# Patient Record
Sex: MALE | Race: BLACK OR AFRICAN AMERICAN | Employment: UNEMPLOYED | RURAL
[De-identification: names, ages, dates, MRNs, and addresses within clinical notes are randomized per-mention and may not be internally consistent; named-entity substitution may affect disease eponyms.]

---

## 2017-03-21 ENCOUNTER — OFFICE VISIT (OUTPATIENT)
Dept: FAMILY MEDICINE CLINIC | Age: 57
End: 2017-03-21

## 2017-03-21 VITALS
RESPIRATION RATE: 20 BRPM | BODY MASS INDEX: 15.98 KG/M2 | TEMPERATURE: 99 F | HEART RATE: 78 BPM | DIASTOLIC BLOOD PRESSURE: 89 MMHG | HEIGHT: 73 IN | SYSTOLIC BLOOD PRESSURE: 112 MMHG | OXYGEN SATURATION: 99 % | WEIGHT: 120.6 LBS

## 2017-03-21 DIAGNOSIS — N39.0 URINARY TRACT INFECTION WITH HEMATURIA, SITE UNSPECIFIED: ICD-10-CM

## 2017-03-21 DIAGNOSIS — A59.9 TRICHOMONAS INFECTION: ICD-10-CM

## 2017-03-21 DIAGNOSIS — K52.9 GASTROENTERITIS: Primary | ICD-10-CM

## 2017-03-21 DIAGNOSIS — R31.9 URINARY TRACT INFECTION WITH HEMATURIA, SITE UNSPECIFIED: ICD-10-CM

## 2017-03-21 LAB
BILIRUB UR QL STRIP: NEGATIVE
GLUCOSE UR-MCNC: NEGATIVE MG/DL
KETONES P FAST UR STRIP-MCNC: NEGATIVE MG/DL
PH UR STRIP: 5.5 [PH] (ref 4.6–8)
PROT UR QL STRIP: NEGATIVE MG/DL
SP GR UR STRIP: 1.03 (ref 1–1.03)
UA UROBILINOGEN AMB POC: NORMAL (ref 0.2–1)
URINALYSIS CLARITY POC: CLEAR
URINALYSIS COLOR POC: YELLOW
URINE BLOOD POC: NORMAL
URINE LEUKOCYTES POC: NEGATIVE
URINE NITRITES POC: NEGATIVE

## 2017-03-21 RX ORDER — METRONIDAZOLE 500 MG/1
TABLET ORAL 3 TIMES DAILY
COMMUNITY
End: 2017-03-21

## 2017-03-21 RX ORDER — ONDANSETRON 4 MG/1
4 TABLET, ORALLY DISINTEGRATING ORAL
Qty: 30 TAB | Refills: 0 | Status: SHIPPED | OUTPATIENT
Start: 2017-03-21 | End: 2017-06-15

## 2017-03-21 RX ORDER — PROMETHAZINE HYDROCHLORIDE 25 MG/1
25 TABLET ORAL
COMMUNITY
End: 2017-06-15

## 2017-03-21 RX ORDER — CIPROFLOXACIN 500 MG/1
TABLET ORAL 2 TIMES DAILY
COMMUNITY
End: 2017-03-21

## 2017-03-21 RX ORDER — DICYCLOMINE HYDROCHLORIDE 20 MG/1
20 TABLET ORAL EVERY 6 HOURS
COMMUNITY
End: 2017-06-15

## 2017-03-21 NOTE — PROGRESS NOTES
Health Maintenance Due   Topic Date Due    DTaP/Tdap/Td series (1 - Tdap) 04/29/1979    INFLUENZA AGE 9 TO ADULT  08/01/2016    COLONOSCOPY  03/15/2017

## 2017-03-21 NOTE — PATIENT INSTRUCTIONS
Soft-Textured, [place] Diet: Care Instructions  Your Care Instructions  A soft-textured, bland diet is used when you need food that is easy to chew, swallow, and digest. You will need to choose soft foods that are low in spices and seasonings. You will need to avoid high-fat foods, as well as caffeine and alcohol. Your doctor or dietitian can help you plan a soft-textured, bland diet based on your health and what you prefer to eat. Ask your doctor how long you should stay on this diet. As you get better, you will probably be able to go back to a regular diet. Talk with your doctor or dietitian before you make changes in your diet. Follow-up care is a key part of your treatment and safety. Be sure to make and go to all appointments, and call your doctor if you are having problems. Its also a good idea to know your test results and keep a list of the medicines you take. How can you care for yourself at home? · Choose foods that are easy to chew and swallow. Good choices are mashed potatoes, soft breads and rolls, cream soups, oatmeal, and Cream of Wheat. · Choose soft, well-cooked vegetables and soft or canned fruits. Good choices are applesauce, ripe bananas, and non-citrus fruit juice. · Try milk, yogurt, or other milk products, if you can digest dairy without too many problems. Your doctor may limit milk and milk products for a while. If so, he or she may recommend a calcium and vitamin D supplement. · Choose soft protein foods such as eggs, tofu, steamed fish, chicken, and turkey. Slow-cooking methods, such as stewing, will help soften meat. Chopping meat in a  or  also will make it easier to eat. · Avoid nuts, raw vegetables, hard crackers, tough meats, and prunes and prune juice. · Avoid foods that are very spicy, such as foods seasoned with black pepper, chili peppers, horseradish, or hot sauce.   · Avoid highly acidic foods such as citrus fruits, citrus fruit juices, and tomato-based foods. · Avoid high-fat foods such as fried meat, chips, and rich desserts. · Check with your doctor before you drink alcohol or beverages that have caffeine, such as coffee, tea, and cola beverages. Where can you learn more? Go to http://young-candis.info/. Enter J802 in the search box to learn more about \"Soft-Textured, Savanah Mims Diet: Care Instructions. \"  Current as of: July 26, 2016  Content Version: 11.1  © 4437-6144 [employer]. Care instructions adapted under license by [employer] (which disclaims liability or warranty for this information). If you have questions about a medical condition or this instruction, always ask your healthcare professional. [address] any warranty or liability for your use of this information.

## 2017-03-21 NOTE — MR AVS SNAPSHOT
Visit Information Date & Time Provider Department Dept. Phone Encounter #  
 3/21/2017  2:50 PM Oz Pizarro [facility] [phone] [id number] Your Appointments 6/15/2017  8:20 AM  
ROUTINE CARE with John Pretty MD  
[address] [facility] [address] [facility]) Appt Note: 6 mnth est pt for refills fasting labs [address] [address]  
[address] [address] Upcoming Health Maintenance Date Due DTaP/Tdap/Td series (1 - Tdap) 4/29/1979 INFLUENZA AGE 9 TO ADULT 8/1/2016 COLONOSCOPY 3/15/2017 Allergies as of 3/21/2017  Review Complete On: 3/21/2017 By: Mojgan Kent No Known Allergies Current Immunizations  Never Reviewed Name Date Pneumococcal Polysaccharide (PPSV-23) 8/28/2015  9:11 AM  
  
 Not reviewed this visit You Were Diagnosed With   
  
 Codes Comments Gastroenteritis    -  Primary ICD-10-CM: K52.9 ICD-9-CM: 558.9 Trichomonas infection     ICD-10-CM: A59.9 ICD-9-CM: 131.9 Vitals BP Pulse Temp Resp Height(growth percentile) Weight(growth percentile) 112/89 78 99 °F (37.2 °C) (Oral) 20 6' 1\" (1.854 m) 120 lb 9.6 oz (54.7 kg) SpO2 BMI Smoking Status 99% 15.91 kg/m2 Current Some Day Smoker Vitals History BMI and BSA Data Body Mass Index Body Surface Area 15.91 kg/m 2 1.68 m 2 Preferred Pharmacy Pharmacy Name Phone [address] [place], [place] - [address] [place] [address] [phone] Your Updated Medication List  
  
   
This list is accurate as of: 3/21/17  3:44 PM.  Always use your most recent med list.  
  
  
  
  
 acetaminophen 325 mg tablet Commonly known as:  TYLENOL Take  by mouth every four (4) hours as needed for Pain. aspirin delayed-release 81 mg tablet TAKE 1 TABLET BY MOUTH DAILY BENTYL 20 mg tablet Generic drug:  dicyclomine Take 20 mg by mouth every six (6) hours. hydroCHLOROthiazide 12.5 mg tablet Commonly known as:  HYDRODIURIL  
TAKE 1 TABLET BY MOUTH DAILY  
  
 ondansetron 4 mg disintegrating tablet Commonly known as:  ZOFRAN ODT Take 1 Tab by mouth every eight (8) hours as needed for Nausea. promethazine 25 mg tablet Commonly known as:  PHENERGAN Take 25 mg by mouth every six (6) hours as needed for Nausea (take every other time with zofran). VENTOLIN HFA 90 mcg/actuation inhaler Generic drug:  albuterol Take 1 Puff by inhalation every six (6) hours as needed for Wheezing. Prescriptions Sent to Pharmacy Refills  
 ondansetron (ZOFRAN ODT) 4 mg disintegrating tablet 0 Sig: Take 1 Tab by mouth every eight (8) hours as needed for Nausea. Class: Normal  
 Pharmacy: [address] [place] [address] #: [phone] Route: Oral  
  
We Performed the Following CULTURE, URINE [id number] CPT(R)] TRICHOMONAS CULTURE [[id number] CPT(R)] To-Do List   
 03/21/2017 Lab:  Janes Young / Gary Layton Patient Instructions Soft-Textured, [place] Diet: Care Instructions Your Care Instructions A soft-textured, bland diet is used when you need food that is easy to chew, swallow, and digest. You will need to choose soft foods that are low in spices and seasonings. You will need to avoid high-fat foods, as well as caffeine and alcohol. Your doctor or dietitian can help you plan a soft-textured, bland diet based on your health and what you prefer to eat. Ask your doctor how long you should stay on this diet. As you get better, you will probably be able to go back to a regular diet. Talk with your doctor or dietitian before you make changes in your diet. Follow-up care is a key part of your treatment and safety.  Be sure to make and go to all appointments, and call your doctor if you are having problems. Its also a good idea to know your test results and keep a list of the medicines you take. How can you care for yourself at home? · Choose foods that are easy to chew and swallow. Good choices are mashed potatoes, soft breads and rolls, cream soups, oatmeal, and Cream of Wheat. · Choose soft, well-cooked vegetables and soft or canned fruits. Good choices are applesauce, ripe bananas, and non-citrus fruit juice. · Try milk, yogurt, or other milk products, if you can digest dairy without too many problems. Your doctor may limit milk and milk products for a while. If so, he or she may recommend a calcium and vitamin D supplement. · Choose soft protein foods such as eggs, tofu, steamed fish, chicken, and turkey. Slow-cooking methods, such as stewing, will help soften meat. Chopping meat in a  or  also will make it easier to eat. · Avoid nuts, raw vegetables, hard crackers, tough meats, and prunes and prune juice. · Avoid foods that are very spicy, such as foods seasoned with black pepper, chili peppers, horseradish, or hot sauce. · Avoid highly acidic foods such as citrus fruits, citrus fruit juices, and tomato-based foods. · Avoid high-fat foods such as fried meat, chips, and rich desserts. · Check with your doctor before you drink alcohol or beverages that have caffeine, such as coffee, tea, and cola beverages. Where can you learn more? Go to http://young-candis.info/. Enter J213 in the search box to learn more about \"Soft-Textured, Glorya Bouillon Diet: Care Instructions. \" Current as of: July 26, 2016 Content Version: 11.1 © 7402-0402 [employer]. Care instructions adapted under license by [employer] (which disclaims liability or warranty for this information).  If you have questions about a medical condition or this instruction, always ask your healthcare professional. [place] [address] any warranty or liability for your use of this information. Introducing [facility]! [employer] introduces [employer] patient portal. Now you can access parts of your medical record, email your doctor's office, and request medication refills online. 1. In your internet browser, go to [email or web address] 2. Click on the First Time User? Click Here link in the Sign In box. You will see the New Member Sign Up page. 3. Enter your Te[employer]Street Access Code exactly as it appears below. You will not need to use this code after youve completed the sign-up process. If you do not sign up before the expiration date, you must request a new code. · [employer] Access Code: [email or web address] Expires: 6/19/2017  2:24 PM 
 
4. Enter the last four digits of your Social Security Number (xxxx) and Date of Birth (mm/dd/yyyy) as indicated and click Submit. You will be taken to the next sign-up page. 5. Create a TeachStreet ID. This will be your [employer]Street login ID and cannot be changed, so think of one that is secure and easy to remember. 6. Create a Te[employer]Street password. You can change your password at any time. 7. Enter your Password Reset Question and Answer. This can be used at a later time if you forget your password. 8. Enter your e-mail address. You will receive e-mail notification when new information is available in [address] 9. Click Sign Up. You can now view and download portions of your medical record. 10. Click the Download Summary menu link to download a portable copy of your medical information. If you have questions, please visit the Frequently Asked Questions section of the TeachStreet website. Remember, [employer] is NOT to be used for urgent needs. For medical emergencies, dial 911. Now available from your iPhone and Android! Please provide this summary of care documentation to your next provider. Your primary care clinician is listed as Jamil Wright. If you have any questions after today's visit, please call [phone].

## 2017-03-21 NOTE — PROGRESS NOTES
Sebas Garibay. is an 60 y.o. male who presents with chief concern of ER follow up. March 17th he was seen by [facility]. Diagnosed with gastroenteritis, and trichomonas with UTI. He was treated with cipro/flagyl, bentyl, and promethazine. He is now having severe nausea with diarrhea. No blood or blackness to it. No recent sexual activity. He has stopped taking the antibiotics today because it is making him sicker. He kept down 4 days of the treatment. He has not been able to eat. Drinking OK. Having pain all across the stomach. Largely improved but still with some mild burning pain with urination. Subjective fevers and chills. 2x diarrhea today intermittent with formed stools between. He does not drink alcohol. Admits metallic taste in mouth. Weight loss:  He has had undulating weights over the last 1 year and recently has had poor PO intake (other than fluids). Weight is net negative 21 lb in last year. He denies any night sweats. He is smoker. Detailed ROS below. Review of Systems, positives are bolded, strike through if denied. GEN:    Weight loss, CV:      Pulm:    Abd/GI:  Abdominal pain/Nausea/Diarrhea Psych:    Neuro:     ENT:      Endocrine:     :    Dysuria  MSK:      Skin:    Lower Ext:           Current and past medical information:  I personally reviewed and included updated list below.     Past Medical History:   Diagnosis Date    Cervical osteoarthritis 8/27/2015    Chronic pain     neck and back    Hypertension     Lumbar back pain     Neck pain        No Known Allergies    Past Surgical History:   Procedure Laterality Date    HX ORTHOPAEDIC      left knee       Social History     Social History    Marital status:      Spouse name: N/A    Number of children: N/A    Years of education: N/A     Social History Main Topics    Smoking status: Current Some Day Smoker     Packs/day: 1.00    Smokeless tobacco: None    Alcohol use 0.0 oz/week     0 Standard drinks or equivalent per week      Comment: occassioally    Drug use: No    Sexual activity: Yes     Partners: Female     Other Topics Concern    None     Social History Narrative           Physical Exam, Abnormal/pertinent findings bolded,     Visit Vitals    /89    Pulse 78    Temp 99 °F (37.2 °C) (Oral)    Resp 20    Ht 6' 1\" (1.854 m)    Wt 120 lb 9.6 oz (54.7 kg)    SpO2 99%    BMI 15.91 kg/m2          GEN:    Alert and Oriented, No acute distress  Psych:  Mood appropriate, No pressured speech, linear thoughts  CV:    Regular Rhythm, S1 and S2 audible, no MRG, no palpable thrills  Pulm:    CTA B/L, no wheezes/rubs  GI/Abd:   Mildly tender to palpation in RL and LLQ, normal bowel sounds x4, no masses, (-) involuntary or voluntary guarding  Neuro:   Lucid, No focal deficits  ENT:    EOMI, Non-icteric sclera, MMM  Neck:   Trachea midline, no lymphadenopathy  :    No suprapubic tenderness  MSK:  Normal gait, FROM in all four extremities  Skin:   No visible Rash, Ecchymosis, or Excoriations  Lower Ext: No edema, No tenderness to palpation, No palpable cords        Assessment/PLAN    1. Gastroenteritis  - He has likely been treated adequately for UTI if uncomplicated considering being male no longer automatically makes it complicated. Due to his struggle with PO intake and other side effects from the flagyl, will stop those after 4 doses and monitor.    - ondansetron (ZOFRAN ODT) 4 mg disintegrating tablet; Take 1 Tab by mouth every eight (8) hours as needed for Nausea. Dispense: 30 Tab; Refill: 0    2. Trichomonas infection    3. Urinary tract infection with hematuria, site unspecified  - AMB POC URINALYSIS DIP STICK AUTO W/O MICRO  - We only have discharge instructions from his ER visit which do not show how the diagnosis was made. Will send for culture and check for other STI's. UA today negative for signs of infection.   If sterile pyuria, consider chlamydia. - CULTURE, [place] [address] [facility]; Future  - TRICHOMONAS CULTURE    Body mass index is 15.91 kg/(m^2). I have reviewed/discussed the below normal BMI with the patient. I have recommended the following interventions: weight gain advised, dietary management education, guidance, and counseling, dietary education for weight gain and lifestyle education regarding diet. The plan is as follows: there is concern for 20# weight loss in the last 1 year. Consider CT abdomen and pelvis with contrast if he does not return to his baseline weight when GE improved. . .      Smoking 1 PPD:  The patient was counseled on the dangers of tobacco use, and was advised to quit and reluctant to quit. Reviewed strategies to maximize success, including removing cigarettes and smoking materials from environment. Follow-up Disposition: Not on File  For next visit consider CT AB&P with contrast to evaluate for indolent mass such as pancreas or other cause of weight loss. 1 week to see if improved abdominal pain. Consider CBC or stool culture and studies. Plan of care:  Discussed diagnoses in detail with patient. Medication risks/benefits/side effects discussed with patient. All of the patient's questions were addressed. The patient understands and agrees with our plan of care. The patient knows to call back if they are unsure of or forget any changes we discussed today or if the symptoms change. The patient received an After-Visit Summary which contains VS, orders, medication list and allergy list. This can be used as a \"mini-medical record\" should they have to seek medical care while out of town.       Thuy Grewal DO  Resident note, PGY-3  Patient discussed with Precept Physician, Rita Lr MD    Future Appointments  Date Time Provider Sachi Muñoz   3/28/2017 8:00 AM Tammy Henao DO [address]   6/15/2017 8:20 AM Little Patel MD [facility] Current Medications after this visit[de-identified]       Current Outpatient Prescriptions   Medication Sig    promethazine (PHENERGAN) 25 mg tablet Take 25 mg by mouth every six (6) hours as needed for Nausea (take every other time with zofran).  dicyclomine (BENTYL) 20 mg tablet Take 20 mg by mouth every six (6) hours.  ondansetron (ZOFRAN ODT) 4 mg disintegrating tablet Take 1 Tab by mouth every eight (8) hours as needed for Nausea.  hydroCHLOROthiazide (HYDRODIURIL) 12.5 mg tablet TAKE 1 TABLET BY MOUTH DAILY    aspirin delayed-release 81 mg tablet TAKE 1 TABLET BY MOUTH DAILY    acetaminophen (TYLENOL) 325 mg tablet Take  by mouth every four (4) hours as needed for Pain.  VENTOLIN HFA 90 mcg/actuation inhaler Take 1 Puff by inhalation every six (6) hours as needed for Wheezing. No current facility-administered medications for this visit.

## 2017-03-21 NOTE — PROGRESS NOTES
I reviewed with the resident the medical history and the resident's findings on the physical examination. I discussed with the resident the patient's diagnosis and concur with the plan. Difficult to tell exactly what happened in ER. Records requested. Would strongly recommend testing for HIV in the future given weight loss and h/o STI. ER precautions given for abdominal pain and close clinic f/u.

## 2017-03-23 ENCOUNTER — DOCUMENTATION ONLY (OUTPATIENT)
Dept: FAMILY MEDICINE CLINIC | Age: 57
End: 2017-03-23

## 2017-03-23 DIAGNOSIS — N20.0 STAGHORN RENAL CALCULUS: Primary | ICD-10-CM

## 2017-03-23 DIAGNOSIS — N28.1 RENAL CYST: ICD-10-CM

## 2017-03-23 LAB
C TRACH RRNA SPEC QL NAA+PROBE: NEGATIVE
N GONORRHOEA RRNA SPEC QL NAA+PROBE: NEGATIVE

## 2017-03-23 NOTE — PROGRESS NOTES
Records received and to be scanned to chart. CT w contrast on March 17th, 2017 revealed right sided small renal cysts. There is a large simple cyst 5.4x6.8 cm. A large staghorn calculus in superior pole of left kidney 2 cm x 1.6 cm without hydronephrosis. No definitive pancreatitis. No mention of diverticulitis. Diagnosed with Trichomonas, Gastroenteritis and UTI.      Leroy Gomez DO   2:22 PM   03/23/17

## 2017-03-25 LAB — BACTERIA UR CULT: NO GROWTH

## 2017-03-30 ENCOUNTER — OFFICE VISIT (OUTPATIENT)
Dept: FAMILY MEDICINE CLINIC | Age: 57
End: 2017-03-30

## 2017-03-30 VITALS
SYSTOLIC BLOOD PRESSURE: 116 MMHG | RESPIRATION RATE: 14 BRPM | BODY MASS INDEX: 16.81 KG/M2 | OXYGEN SATURATION: 99 % | HEART RATE: 74 BPM | TEMPERATURE: 97.6 F | WEIGHT: 126.8 LBS | HEIGHT: 73 IN | DIASTOLIC BLOOD PRESSURE: 77 MMHG

## 2017-03-30 DIAGNOSIS — N20.0 STAGHORN KIDNEY STONES: ICD-10-CM

## 2017-03-30 DIAGNOSIS — I10 ESSENTIAL HYPERTENSION: Primary | ICD-10-CM

## 2017-03-30 DIAGNOSIS — N28.1 RENAL CYST: ICD-10-CM

## 2017-03-30 RX ORDER — HYDROCHLOROTHIAZIDE 12.5 MG/1
TABLET ORAL
Qty: 30 TAB | Refills: 5 | Status: SHIPPED | OUTPATIENT
Start: 2017-03-30 | End: 2018-01-25 | Stop reason: SDUPTHER

## 2017-03-30 NOTE — PROGRESS NOTES
Reviewed record in preparation for visit and have necessary documentation      Body mass index is 16.73 kg/(m^2).     Health Maintenance Due   Topic Date Due    DTaP/Tdap/Td series (1 - Tdap) 04/29/1979    INFLUENZA AGE 9 TO ADULT  08/01/2016    COLONOSCOPY  03/15/2017

## 2017-03-30 NOTE — MR AVS SNAPSHOT
Visit Information Date & Time Provider Department Dept. Phone Encounter #  
 3/30/2017  9:10 AM Lou Lloyd, [facility] [phone] [id number] Your Appointments 6/15/2017  8:20 AM  
ROUTINE CARE with Donnette Gitelman, MD  
[address] [facility] [address] [facility]) Appt Note: 6 mnth est pt for refills fasting labs [id number] [address]  
[address] [place] [id number] Upcoming Health Maintenance Date Due DTaP/Tdap/Td series (1 - Tdap) 4/29/1979 INFLUENZA AGE 9 TO ADULT 8/1/2016 COLONOSCOPY 3/15/2017 Allergies as of 3/30/2017  Review Complete On: 3/30/2017 By: Trena Mascorro No Known Allergies Current Immunizations  Never Reviewed Name Date Pneumococcal Polysaccharide (PPSV-23) 8/28/2015  9:11 AM  
  
 Not reviewed this visit You Were Diagnosed With   
  
 Codes Comments Essential hypertension    -  Primary ICD-10-CM: I10 
ICD-9-CM: 401.9 Vitals BP Pulse Temp Resp Height(growth percentile) Weight(growth percentile) 116/77 (BP 1 Location: Right arm, BP Patient Position: Sitting) 74 97.6 °F (36.4 °C) (Oral) 14 6' 1\" (1.854 m) 126 lb 12.8 oz (57.5 kg) SpO2 BMI Smoking Status 99% 16.73 kg/m2 Current Some Day Smoker Vitals History BMI and BSA Data Body Mass Index Body Surface Area  
 16.73 kg/m 2 1.72 m 2 Preferred Pharmacy Pharmacy Name Phone [address] [place], [place] - [facility] [phone] Your Updated Medication List  
  
   
This list is accurate as of: 3/30/17  9:28 AM.  Always use your most recent med list.  
  
  
  
  
 acetaminophen 325 mg tablet Commonly known as:  TYLENOL Take  by mouth every four (4) hours as needed for Pain. aspirin delayed-release 81 mg tablet TAKE 1 TABLET BY MOUTH DAILY BENTYL 20 mg tablet Generic drug:  dicyclomine Take 20 mg by mouth every six (6) hours. hydroCHLOROthiazide 12.5 mg tablet Commonly known as:  HYDRODIURIL  
TAKE 1 TABLET BY MOUTH DAILY  
  
 ondansetron 4 mg disintegrating tablet Commonly known as:  ZOFRAN ODT Take 1 Tab by mouth every eight (8) hours as needed for Nausea. promethazine 25 mg tablet Commonly known as:  PHENERGAN Take 25 mg by mouth every six (6) hours as needed for Nausea (take every other time with zofran). VENTOLIN HFA 90 mcg/actuation inhaler Generic drug:  albuterol Take 1 Puff by inhalation every six (6) hours as needed for Wheezing. Prescriptions Sent to Pharmacy Refills  
 hydroCHLOROthiazide (HYDRODIURIL) 12.5 mg tablet 5 Sig: TAKE 1 TABLET BY MOUTH DAILY Class: Normal  
 Pharmacy: [facility] #: [phone] Patient Instructions Kidney Stone: Care Instructions Your Care Instructions Kidney stones are formed when salts, minerals, and other substances normally found in the urine clump together. They can be as small as grains of sand or, rarely, as large as golf balls. While the stone is traveling through the ureter, which is the tube that carries urine from the kidney to the bladder, you will probably feel pain. The pain may be mild or very severe. You may also have some blood in your urine. As soon as the stone reaches the bladder, any intense pain should go away. If a stone is too large to pass on its own, you may need a medical procedure to help you pass the stone. The doctor has checked you carefully, but problems can develop later. If you notice any problems or new symptoms, get medical treatment right away. Follow-up care is a key part of your treatment and safety.  Be sure to make and go to all appointments, and call your doctor if you are having problems. It's also a good idea to know your test results and keep a list of the medicines you take. How can you care for yourself at home? · Drink plenty of fluids, enough so that your urine is light yellow or clear like water. If you have kidney, heart, or liver disease and have to limit fluids, talk with your doctor before you increase the amount of fluids you drink. · Take pain medicines exactly as directed. Call your doctor if you think you are having a problem with your medicine. ¨ If the doctor gave you a prescription medicine for pain, take it as prescribed. ¨ If you are not taking a prescription pain medicine, ask your doctor if you can take an over-the-counter medicine. Read and follow all instructions on the label. · Your doctor may ask you to strain your urine so that you can collect your kidney stone when it passes. You can use a kitchen strainer or a tea strainer to catch the stone. Store it in a plastic bag until you see your doctor again. Preventing future kidney stones Some changes in your diet may help prevent kidney stones. Depending on the cause of your stones, your doctor may recommend that you: · Drink plenty of fluids, enough so that your urine is light yellow or clear like water. If you have kidney, heart, or liver disease and have to limit fluids, talk with your doctor before you increase the amount of fluids you drink. · Limit coffee, tea, and alcohol. Also avoid grapefruit juice. · Do not take more than the recommended daily dose of vitamins C and D. 
· Avoid antacids such as Gaviscon, Maalox, Mylanta, or Tums. · Limit the amount of salt (sodium) in your diet. · Eat a balanced diet that is not too high in protein. · Limit foods that are high in a substance called oxalate, which can cause kidney stones. These foods include dark green vegetables, rhubarb, chocolate, wheat bran, nuts, cranberries, and beans. When should you call for help? Call your doctor now or seek immediate medical care if: 
· You cannot keep down fluids. · Your pain gets worse. · You have a fever or chills. · You have new or worse pain in your back just below your rib cage (the flank area). · You have new or more blood in your urine. Watch closely for changes in your health, and be sure to contact your doctor if: 
· You do not get better as expected. Where can you learn more? Go to http://young-candis.info/. Enter A508 in the search box to learn more about \"Kidney Stone: Care Instructions. \" Current as of: November 20, 2015 Content Version: 11.2 © 0695-8569 [employer]. Care instructions adapted under license by [employer] (which disclaims liability or warranty for this information). If you have questions about a medical condition or this instruction, always ask your healthcare professional. [address] any warranty or liability for your use of this information. Introducing [facility]! [facility] introduces Mobile Authentication patient portal. Now you can access parts of your medical record, email your doctor's office, and request medication refills online. 1. In your internet browser, go to [email or web address] 2. Click on the First Time User? Click Here link in the Sign In box. You will see the New Member Sign Up page. 3. Enter your Mobile Authentication Access Code exactly as it appears below. You will not need to use this code after youve completed the sign-up process. If you do not sign up before the expiration date, you must request a new code. · Mobile Authentication Access Code: [email or web address] Expires: 6/19/2017  2:24 PM 
 
4. Enter the last four digits of your Social Security Number (xxxx) and Date of Birth (mm/dd/yyyy) as indicated and click Submit. You will be taken to the next sign-up page. 5. Create a Mobile Authentication ID.  This will be your Mobile Authentication login ID and cannot be changed, so think of one that is secure and easy to remember. 6. Create a [employer] password. You can change your password at any time. 7. Enter your Password Reset Question and Answer. This can be used at a later time if you forget your password. 8. Enter your e-mail address. You will receive e-mail notification when new information is available in [address] 9. Click Sign Up. You can now view and download portions of your medical record. 10. Click the Download Summary menu link to download a portable copy of your medical information. If you have questions, please visit the Frequently Asked Questions section of the [employer] website. Remember, [employer] is NOT to be used for urgent needs. For medical emergencies, dial 911. Now available from your iPhone and Android! Please provide this summary of care documentation to your next provider. Your primary care clinician is listed as Darlene Jones. If you have any questions after today's visit, please call [phone].

## 2017-03-30 NOTE — PROGRESS NOTES
I discussed the findings, assessment and plan with the resident and agree with the resident's findings and plan as documented in the resident's note. Nino Mesa M.D.

## 2017-03-30 NOTE — PATIENT INSTRUCTIONS
Kidney Stone: Care Instructions  Your Care Instructions    Kidney stones are formed when salts, minerals, and other substances normally found in the urine clump together. They can be as small as grains of sand or, rarely, as large as golf balls. While the stone is traveling through the ureter, which is the tube that carries urine from the kidney to the bladder, you will probably feel pain. The pain may be mild or very severe. You may also have some blood in your urine. As soon as the stone reaches the bladder, any intense pain should go away. If a stone is too large to pass on its own, you may need a medical procedure to help you pass the stone. The doctor has checked you carefully, but problems can develop later. If you notice any problems or new symptoms, get medical treatment right away. Follow-up care is a key part of your treatment and safety. Be sure to make and go to all appointments, and call your doctor if you are having problems. It's also a good idea to know your test results and keep a list of the medicines you take. How can you care for yourself at home? · Drink plenty of fluids, enough so that your urine is light yellow or clear like water. If you have kidney, heart, or liver disease and have to limit fluids, talk with your doctor before you increase the amount of fluids you drink. · Take pain medicines exactly as directed. Call your doctor if you think you are having a problem with your medicine. ¨ If the doctor gave you a prescription medicine for pain, take it as prescribed. ¨ If you are not taking a prescription pain medicine, ask your doctor if you can take an over-the-counter medicine. Read and follow all instructions on the label. · Your doctor may ask you to strain your urine so that you can collect your kidney stone when it passes. You can use a kitchen strainer or a tea strainer to catch the stone. Store it in a plastic bag until you see your doctor again.   Preventing future kidney stones  Some changes in your diet may help prevent kidney stones. Depending on the cause of your stones, your doctor may recommend that you:  · Drink plenty of fluids, enough so that your urine is light yellow or clear like water. If you have kidney, heart, or liver disease and have to limit fluids, talk with your doctor before you increase the amount of fluids you drink. · Limit coffee, tea, and alcohol. Also avoid grapefruit juice. · Do not take more than the recommended daily dose of vitamins C and D.  · Avoid antacids such as Gaviscon, Maalox, Mylanta, or Tums. · Limit the amount of salt (sodium) in your diet. · Eat a balanced diet that is not too high in protein. · Limit foods that are high in a substance called oxalate, which can cause kidney stones. These foods include dark green vegetables, rhubarb, chocolate, wheat bran, nuts, cranberries, and beans. When should you call for help? Call your doctor now or seek immediate medical care if:  · You cannot keep down fluids. · Your pain gets worse. · You have a fever or chills. · You have new or worse pain in your back just below your rib cage (the flank area). · You have new or more blood in your urine. Watch closely for changes in your health, and be sure to contact your doctor if:  · You do not get better as expected. Where can you learn more? Go to http://young-candis.info/. Enter J190 in the search box to learn more about \"Kidney Stone: Care Instructions. \"  Current as of: November 20, 2015  Content Version: 11.2  © 8572-7234 [employer]. Care instructions adapted under license by [employer] (which disclaims liability or warranty for this information). If you have questions about a medical condition or this instruction, always ask your healthcare professional. [address] any warranty or liability for your use of this information.

## 2017-03-30 NOTE — PROGRESS NOTES
Maldonado Bello. is an 60 y.o. male who presents with chief concern of follow up staghorn stone. Patient seen last week for abdominal pain of unknown origin as a follow up from ED. His patient instructions were for trichomonas and mentioned diagnosis of gastroenteritis with atypical treatment plan. He was having diarrhea from antibiotics so they were stopped. Later that week, the studies done at ER visit were sent and were discordant with story. \"Records received and to be scanned to chart.      CT w contrast on March 17th, 2017 revealed right sided small renal cysts. There is a large simple cyst 5.4x6.8 cm. A large staghorn calculus in superior pole of left kidney 2 cm x 1.6 cm without hydronephrosis. No definitive pancreatitis. No mention of diverticulitis.      Diagnosed with Trichomonas, Gastroenteritis and UTI.      Girish Dupree, DO   2:22 PM   03/23/17\"    He was promptly sent to Urology and seen. They have suggested MRI and follow up for consideration of surgical vs conservative treatment plan for the large cysts and stones. His abdominal pain is still colicky in nature but is very mild. He has done well since stopping the cipro/flagyl and no longer having diarrhea. Denies fevers, chills, night sweats. Denies dysuria, hematuria or uti like symptoms. HTN:  He does not check at home and is in need of refill. He denies any chest pain, palp, SOB, MONTOYA, HA, VC, leg swelling. Detailed ROS below. Review of Systems, positives are bolded, strike through if denied. GEN:    CV:      Pulm:    Abd/GI:  Abdominal pain Psych:    Neuro:     ENT:      Endocrine:     :      MSK:      Skin:    Lower Ext:           Current and past medical information:  I personally reviewed and included updated list below.     Past Medical History:   Diagnosis Date    Cervical osteoarthritis 8/27/2015    Chronic pain     neck and back    Hypertension     Lumbar back pain     Neck pain     Staghorn kidney stones 3/30/2017    Staghorn kidney stones 3/30/2017    A large staghorn calculus in superior pole of left kidney 2 cm x 1.6 cm       No Known Allergies    Past Surgical History:   Procedure Laterality Date    HX ORTHOPAEDIC      left knee       Social History     Social History    Marital status:      Spouse name: N/A    Number of children: N/A    Years of education: N/A     Social History Main Topics    Smoking status: Current Some Day Smoker     Packs/day: 1.00    Smokeless tobacco: None    Alcohol use 0.0 oz/week     0 Standard drinks or equivalent per week      Comment: occassioally    Drug use: No    Sexual activity: Yes     Partners: Female     Other Topics Concern    None     Social History Narrative           Physical Exam, Abnormal/pertinent findings bolded,     Visit Vitals    /77 (BP 1 Location: Right arm, BP Patient Position: Sitting)    Pulse 74    Temp 97.6 °F (36.4 °C) (Oral)    Resp 14    Ht 6' 1\" (1.854 m)    Wt 126 lb 12.8 oz (57.5 kg)    SpO2 99%    BMI 16.73 kg/m2          GEN:    Pleasant, Much more comfortable appearing, Alert and Oriented, No acute distress  Psych:  Mood appropriate, No pressured speech, linear thoughts  CV:    Regular Rhythm, S1 and S2 audible, no MRG, no palpable thrills  Pulm:    CTA B/L, no wheezes/rubs  GI/Abd:   Non-tender to palpation, normal bowel sounds x4, no masses, (-) involuntary or voluntary guarding  Neuro:   Lucid, No focal deficits  ENT:    EOMI, Non-icteric sclera, MMM  Neck:   Trachea midline, no lymphadenopathy  :    No suprapubic tenderness  MSK:  Normal gait, FROM in all four extremities  Skin:   No visible Rash, Ecchymosis, or Excoriations  Lower Ext: No edema, No tenderness to palpation, No palpable cords        Assessment/PLAN    1. Essential hypertension  - Doing well, BP diastolic initially elevated but on repeat was perfect. Plan for refill of current medication.   HCTZ should not increase his risk for his particular type of kidney stone. Consider switching as Ca+Ox stones may be more present in HCTZ. - hydroCHLOROthiazide (HYDRODIURIL) 12.5 mg tablet; TAKE 1 TABLET BY MOUTH DAILY  Dispense: 30 Tab; Refill: 5    2. Staghorn kidney stones  - Continue to f/u with urology. Discussed possible plan of care and expectations for Urologist. He had MRI and plans to follow up with them early next week. Consider urine and stone studies. 3. Renal cyst  - Continue to monitor with urology. He had microscopic hematuria which should be evaluated once stone is resolved. Described as simple in nature. Body mass index is 16.73 kg/(m^2). I have reviewed/discussed the below normal BMI with the patient and spouse. I have recommended the following interventions: weight gain advised and nutrition/feeding management. .    Follow-up Disposition: Not on File  For next visit follow up for routine care, as needed for stone management and pain control. Plan of care:  Discussed diagnoses in detail with patient. Medication risks/benefits/side effects discussed with patient. All of the patient's questions were addressed. The patient understands and agrees with our plan of care. The patient knows to call back if they are unsure of or forget any changes we discussed today or if the symptoms change. The patient received an After-Visit Summary which contains VS, orders, medication list and allergy list. This can be used as a \"mini-medical record\" should they have to seek medical care while out of town.       Sparkle Castillo DO  Resident note, PGY-3  Patient discussed with [address] [place] [address] Physician, Dr. Mark Domingo  Date Time Provider Sachi Ramirezi   5/30/2017 9:30 AM Sparkle Castillo DO [facility] SCHED   6/15/2017 8:20 AM Darien Calderón MD [facility] SCHED           Current Medications after this visit[de-identified]       Current Outpatient Prescriptions   Medication Sig    hydroCHLOROthiazide (HYDRODIURIL) 12.5 mg tablet TAKE 1 TABLET BY MOUTH DAILY    promethazine (PHENERGAN) 25 mg tablet Take 25 mg by mouth every six (6) hours as needed for Nausea (take every other time with zofran).  dicyclomine (BENTYL) 20 mg tablet Take 20 mg by mouth every six (6) hours.  ondansetron (ZOFRAN ODT) 4 mg disintegrating tablet Take 1 Tab by mouth every eight (8) hours as needed for Nausea.  aspirin delayed-release 81 mg tablet TAKE 1 TABLET BY MOUTH DAILY    acetaminophen (TYLENOL) 325 mg tablet Take  by mouth every four (4) hours as needed for Pain.  VENTOLIN HFA 90 mcg/actuation inhaler Take 1 Puff by inhalation every six (6) hours as needed for Wheezing. No current facility-administered medications for this visit.

## 2017-04-04 ENCOUNTER — DOCUMENTATION ONLY (OUTPATIENT)
Dept: FAMILY MEDICINE CLINIC | Age: 57
End: 2017-04-04

## 2017-04-04 NOTE — PROGRESS NOTES
Note received and to be scanned from Urology. Renal calculus to be treated surgical or endoscopically at next available appointment, per 4/3/17 visit with Dr. Armando Martin     Will follow.     Matt Keenan,   12:41 PM

## 2017-05-24 ENCOUNTER — PATIENT OUTREACH (OUTPATIENT)
Dept: FAMILY MEDICINE CLINIC | Age: 57
End: 2017-05-24

## 2017-05-24 NOTE — PROGRESS NOTES
[facility] CJ 5/16-5/23/2017 PCNL treatment    Unable to reach by phone. Voicemail left requesting return call. Patient admitted for pcnl treatment of left sided kidney stones. Admitted for pain control and hematoma. CT scans both showing resolution and stable. Patient continued to improve.

## 2017-05-31 ENCOUNTER — PATIENT OUTREACH (OUTPATIENT)
Dept: FAMILY MEDICINE CLINIC | Age: 57
End: 2017-05-31

## 2017-05-31 NOTE — LETTER
5/31/2017 3:51 PM 
 
Mr. [address] [id number] Your health is very important to us. I have been unable to reach you by phone concerning your recent hospitalization. You also missed your follow up appointment with us yesterday. It is important that you follow up with your primary care physician. Please call our office today to reschedule an appointment. Sincerely, Francia Zamorano RN

## 2017-05-31 NOTE — PROGRESS NOTES
[facility] W 5/16-5/23/2017 PCNL treatment    NN unable to reach by phone after multiple attempts. Patient was a no show to appointment yesterday. Will mail letter today.

## 2017-06-15 ENCOUNTER — OFFICE VISIT (OUTPATIENT)
Dept: FAMILY MEDICINE CLINIC | Age: 57
End: 2017-06-15

## 2017-06-15 ENCOUNTER — PATIENT OUTREACH (OUTPATIENT)
Dept: FAMILY MEDICINE CLINIC | Age: 57
End: 2017-06-15

## 2017-06-15 VITALS
TEMPERATURE: 98.4 F | SYSTOLIC BLOOD PRESSURE: 112 MMHG | OXYGEN SATURATION: 100 % | RESPIRATION RATE: 16 BRPM | DIASTOLIC BLOOD PRESSURE: 82 MMHG | HEART RATE: 87 BPM | BODY MASS INDEX: 17.28 KG/M2 | HEIGHT: 73 IN | WEIGHT: 130.4 LBS

## 2017-06-15 DIAGNOSIS — I10 ESSENTIAL HYPERTENSION: Primary | ICD-10-CM

## 2017-06-15 DIAGNOSIS — D64.9 ANEMIA, UNSPECIFIED TYPE: ICD-10-CM

## 2017-06-15 DIAGNOSIS — A04.8 H. PYLORI INFECTION: ICD-10-CM

## 2017-06-15 DIAGNOSIS — Z72.0 TOBACCO ABUSE: ICD-10-CM

## 2017-06-15 RX ORDER — LANOLIN ALCOHOL/MO/W.PET/CERES
CREAM (GRAM) TOPICAL
COMMUNITY
End: 2018-01-25 | Stop reason: SDUPTHER

## 2017-06-15 RX ORDER — PANTOPRAZOLE SODIUM 40 MG/1
40 TABLET, DELAYED RELEASE ORAL DAILY
COMMUNITY
End: 2017-07-17

## 2017-06-15 RX ORDER — AMOXICILLIN 500 MG/1
500 CAPSULE ORAL
COMMUNITY
End: 2017-07-17

## 2017-06-15 RX ORDER — CLARITHROMYCIN 500 MG/1
TABLET, FILM COATED ORAL
COMMUNITY
End: 2017-07-17

## 2017-06-15 NOTE — MR AVS SNAPSHOT
Visit Information Date & Time Provider Department Dept. Phone Encounter #  
 6/15/2017  8:20 AM Erika Cardenas MD [facility] [id number] Follow-up Instructions Return in about 4 weeks (around 7/13/2017). Upcoming Health Maintenance Date Due DTaP/Tdap/Td series (1 - Tdap) 4/29/1979 COLONOSCOPY 3/15/2017 INFLUENZA AGE 9 TO ADULT 8/1/2017 Allergies as of 6/15/2017  Review Complete On: 6/15/2017 By: Erika Cardenas MD  
 No Known Allergies Current Immunizations  Never Reviewed Name Date Pneumococcal Polysaccharide (PPSV-23) 8/28/2015  9:11 AM  
  
 Not reviewed this visit You Were Diagnosed With   
  
 Codes Comments Essential hypertension    -  Primary ICD-10-CM: I10 
ICD-9-CM: 401.9 Vitals BP Pulse Temp Resp Height(growth percentile) Weight(growth percentile) 112/82 (BP 1 Location: Left arm, BP Patient Position: Sitting) 87 98.4 °F (36.9 °C) (Oral) 16 6' 1\" (1.854 m) 130 lb 6.4 oz (59.1 kg) SpO2 BMI Smoking Status 100% 17.2 kg/m2 Current Some Day Smoker Vitals History BMI and BSA Data Body Mass Index Body Surface Area  
 17.2 kg/m 2 1.74 m 2 Preferred Pharmacy Pharmacy Name Phone [address] [address] [phone] Your Updated Medication List  
  
   
This list is accurate as of: 6/15/17  8:56 AM.  Always use your most recent med list.  
  
  
  
  
 amoxicillin 500 mg capsule Commonly known as:  AMOXIL Take 500 mg by mouth. aspirin delayed-release 81 mg tablet TAKE 1 TABLET BY MOUTH DAILY  
  
 clarithromycin 500 mg tablet Commonly known as:  Racquel Millerler Take  by mouth. ferrous sulfate 325 mg (65 mg iron) tablet Take  by mouth Daily (before breakfast). hydroCHLOROthiazide 12.5 mg tablet Commonly known as:  HYDRODIURIL  
TAKE 1 TABLET BY MOUTH DAILY pantoprazole 40 mg tablet Commonly known as:  PROTONIX Take 40 mg by mouth daily. Follow-up Instructions Return in about 4 weeks (around 7/13/2017). Introducing [facility] & HEALTH SERVICES! Jenniferuriel Carter introduces [employer] patient portal. Now you can access parts of your medical record, email your doctor's office, and request medication refills online. 1. In your internet browser, go to [email or web address] 2. Click on the First Time User? Click Here link in the Sign In box. You will see the New Member Sign Up page. 3. Enter your St[employer] Access Code exactly as it appears below. You will not need to use this code after youve completed the sign-up process. If you do not sign up before the expiration date, you must request a new code. · [employer] Access Code: [email or web address] Expires: 6/19/2017  2:24 PM 
 
4. Enter the last four digits of your Social Security Number (xxxx) and Date of Birth (mm/dd/yyyy) as indicated and click Submit. You will be taken to the next sign-up page. 5. Create a St[employer] ID. This will be your [employer] login ID and cannot be changed, so think of one that is secure and easy to remember. 6. Create a St[employer] password. You can change your password at any time. 7. Enter your Password Reset Question and Answer. This can be used at a later time if you forget your password. 8. Enter your e-mail address. You will receive e-mail notification when new information is available in [address]. 9. Click Sign Up. You can now view and download portions of your medical record. 10. Click the Download Summary menu link to download a portable copy of your medical information. If you have questions, please visit the Frequently Asked Questions section of the [employer] website. Remember, [employer] is NOT to be used for urgent needs. For medical emergencies, dial 911. Now available from your iPhone and Android! Please provide this summary of care documentation to your next provider. Your primary care clinician is listed as Eda Pham. If you have any questions after today's visit, please call [phone].

## 2017-06-15 NOTE — PROGRESS NOTES
Chief Complaint   Patient presents with    Medication Refill    Labs     Body mass index is 17.2 kg/(m^2).     Reviewed record in preparation for visit and have necessary documentation  Pt did not bring medication to office visit for review  Information was given to pt on Advanced Directives, Living Will  Information was given on Shingles Vaccine  Opportunity was given for questions  Goals that were addressed and/or need to be completed after this appointment include:     Health Maintenance Due   Topic Date Due    DTaP/Tdap/Td series (1 - Tdap) 04/29/1979    COLONOSCOPY  03/15/2017     Colonoscopy report requested from [facility]

## 2017-06-15 NOTE — PATIENT INSTRUCTIONS

## 2017-06-15 NOTE — PROGRESS NOTES
NNTOCIP: 5/28/2017-6/2/2017:  Carmelo Mendoza  Failure to Thrive, anemia,chronic, S/P cystoscopy, S/P polypectomy. Medical records obtained given to PCP. Patient visited with PCP for DIANNA. Please note PCP documentation and plan of care. Please note Functional Assessment. Patient was discharged on Clarithromycin,amoxicillin and Protonix for 14 days to complete eradication of H. Pylori. Patient stated he will set appointment with Urologist.  Patient will follow up with PCP again 7/18/2017,sooner if necessary. NN reviewed RED FLAGS with patient and she verbalized understanding when to seek immediate medical attention. Red Flags/SEPSIS: Fever,or below normal temperature 97F,chills,Nausea,Vomiting,Diarrhea, unable to take medications,pain,SOB,chest pain or discomfort,confusion,unusual sensations,BFAST, feeling poorly after a period of improvement. NN will attempt to discuss Advance Directive with patient during next appointment.

## 2017-06-23 NOTE — PROGRESS NOTES
Chief Complaint   Patient presents with    Medication Refill    Labs     he is a 59y.o. year old male who presents for follow up. Patient with recent hospitalization in [address] [place] [address] from 5/28/2017-6/2/2017 for  Failure to Thrive, anemia, S/P cystoscopy, S/P polypectomy and H.pylori infection. Patient with hx of right hemiparesis. Patient is under weight and he continues to smoke. Today patient denies HA, dizziness, SOB, CP, abdominal pain, dysuria, acute myalgias or arthralgias. BP Readings from Last 3 Encounters:   06/15/17 112/82   03/30/17 116/77   03/21/17 112/89     Wt Readings from Last 3 Encounters:   06/15/17 130 lb 6.4 oz (59.1 kg)   03/30/17 126 lb 12.8 oz (57.5 kg)   03/21/17 120 lb 9.6 oz (54.7 kg)     Body mass index is 17.2 kg/(m^2). Medications:     Current Outpatient Prescriptions   Medication Sig    clarithromycin (BIAXIN) 500 mg tablet Take  by mouth.  pantoprazole (PROTONIX) 40 mg tablet Take 40 mg by mouth daily.  hydroCHLOROthiazide (HYDRODIURIL) 12.5 mg tablet TAKE 1 TABLET BY MOUTH DAILY    aspirin delayed-release 81 mg tablet TAKE 1 TABLET BY MOUTH DAILY    amoxicillin (AMOXIL) 500 mg capsule Take 500 mg by mouth.  ferrous sulfate 325 mg (65 mg iron) tablet Take  by mouth Daily (before breakfast). No current facility-administered medications for this visit.         Problem List:     Patient Active Problem List    Diagnosis Date Noted    Renal cyst 03/30/2017    Staghorn kidney stones 03/30/2017    Cervical osteoarthritis 08/27/2015    Acute right hemiparesis ([facility] [address] 08/27/2015    Arthritis of hand 06/22/2015    HTN (hypertension) 04/26/2012    Lumbar back pain     Neck pain     Chronic pain        Medical History:     Past Medical History:   Diagnosis Date    Cervical osteoarthritis 8/27/2015    Chronic pain     neck and back    Hypertension     Lumbar back pain     Neck pain     Staghorn kidney stones 3/30/2017    Staghorn kidney stones 3/30/2017    A large staghorn calculus in superior pole of left kidney 2 cm x 1.6 cm       Allergies:   No Known Allergies    Surgical History:     Past Surgical History:   Procedure Laterality Date    HX ORTHOPAEDIC      left knee       Social History:     Social History     Social History    Marital status:      Spouse name: N/A    Number of children: N/A    Years of education: N/A     Social History Main Topics    Smoking status: Current Some Day Smoker     Packs/day: 1.00    Smokeless tobacco: None    Alcohol use 0.0 oz/week     0 Standard drinks or equivalent per week      Comment: occassioally    Drug use: No    Sexual activity: Yes     Partners: Female     Other Topics Concern    None     Social History Narrative          Patient Active Problem List   Diagnosis Code    Lumbar back pain M54.5    Neck pain M54.2    Chronic pain G89.29    HTN (hypertension) I10    Arthritis of hand M19.049    Cervical osteoarthritis M47.812    Acute right hemiparesis (HCC) G81.91    Renal cyst N28.1    Staghorn kidney stones N20.0     Past Surgical History:   Procedure Laterality Date    HX ORTHOPAEDIC      left knee     Social History     Social History    Marital status:      Spouse name: N/A    Number of children: N/A    Years of education: N/A     Occupational History    Not on file. Social History Main Topics    Smoking status: Current Some Day Smoker     Packs/day: 1.00    Smokeless tobacco: Not on file    Alcohol use 0.0 oz/week     0 Standard drinks or equivalent per week      Comment: occassioally    Drug use: No    Sexual activity: Yes     Partners: Female     Other Topics Concern    Not on file     Social History Narrative     Family History   Problem Relation Age of Onset    Cancer Mother     Cancer Father     Stroke Father      Current Outpatient Prescriptions   Medication Sig    clarithromycin (BIAXIN) 500 mg tablet Take  by mouth.     pantoprazole (PROTONIX) 40 mg tablet Take 40 mg by mouth daily.  hydroCHLOROthiazide (HYDRODIURIL) 12.5 mg tablet TAKE 1 TABLET BY MOUTH DAILY    aspirin delayed-release 81 mg tablet TAKE 1 TABLET BY MOUTH DAILY    amoxicillin (AMOXIL) 500 mg capsule Take 500 mg by mouth.  ferrous sulfate 325 mg (65 mg iron) tablet Take  by mouth Daily (before breakfast). No current facility-administered medications for this visit. No Known Allergies    Review of Systems:  Constitutional: c/o fatigue, denies F/C  Skin: Negative for rash or lesion  HEENT: Negative for acute hearing or vision changes  Respiratory: Negative for cough, wheezing or SOB  Cardiovascular: Negative for chest pain, dizziness or palpitations  Gastrointestinal: Negative for nausea or abdominal pain  Genital/urinary: Negative for dysuria or voiding dysfunction  Musculoskeletal: Negative for acute myalgia or arthralgia   Neurological: Negative for HA, weakness or paresthesia  Psychlogical: Negative for depression or anxiety     Vitals:    06/15/17 0805   BP: 112/82   Pulse: 87   Resp: 16   Temp: 98.4 °F (36.9 °C)   TempSrc: Oral   SpO2: 100%   Weight: 130 lb 6.4 oz (59.1 kg)   Height: 6' 1\" (1.854 m)       Physical Examination:  General: Thin, in no acute distress  Skin: Warm and dry, no rash or lesion appreciated  Head: Normocephalic, atraumatic  Eyes: Sclera clear, EOMI  Neck: Normal range of motion  Respiratory: Clear to auscultation bilaterally with symmetrical effort  Cardiovascular: Normal S1, S2, Regular rate and rhythm  Extremities: Full range of motion, Normal gait  Neurological: Normal strength and sensation. No focal deficits  Psychological: Active, alert and oriented. Affect appropriate     Cam Mosley was seen today for medication refill and labs. Diagnoses and all orders for this visit:    Essential hypertension    Available medical records reviewed. Strongly advised patient to stop all use of tobacco products.   I have discussed the diagnosis with the patient and the intended plan as seen in the above orders. The patient expresses understanding and agreement with our plan of care. All of the patient's questions were answered to apparent satisfaction. The patient has received an after-visit summary. The patient knows to call our office if there are any questions or concerns regarding diagnosis and treatment plans. I have discussed medication side effects and warnings with the patient as well. Follow-up Disposition:  Return in about 4 weeks (around 7/13/2017).

## 2017-07-17 ENCOUNTER — OFFICE VISIT (OUTPATIENT)
Dept: FAMILY MEDICINE CLINIC | Age: 57
End: 2017-07-17

## 2017-07-17 VITALS
TEMPERATURE: 98.4 F | WEIGHT: 133 LBS | OXYGEN SATURATION: 97 % | SYSTOLIC BLOOD PRESSURE: 125 MMHG | HEART RATE: 88 BPM | DIASTOLIC BLOOD PRESSURE: 84 MMHG | HEIGHT: 73 IN | BODY MASS INDEX: 17.63 KG/M2 | RESPIRATION RATE: 16 BRPM

## 2017-07-17 DIAGNOSIS — I10 ESSENTIAL HYPERTENSION: Primary | ICD-10-CM

## 2017-07-17 NOTE — PATIENT INSTRUCTIONS

## 2017-07-17 NOTE — PROGRESS NOTES
Reviewed record in preparation for visit and have necessary documentation  Opportunity was given for questions  Goals that were addressed and/or need to be completed after this appointment include   Health Maintenance Due   Topic Date Due    DTaP/Tdap/Td series (1 - Tdap) 04/29/1979    COLONOSCOPY  03/15/2017

## 2017-07-17 NOTE — MR AVS SNAPSHOT
Visit Information Date & Time Provider Department Dept. Phone Encounter #  
 7/17/2017  1:20 PM Fely Rodriguez MD [address] [facility] [id number] Upcoming Health Maintenance Date Due DTaP/Tdap/Td series (1 - Tdap) 4/29/1979 COLONOSCOPY 3/15/2017 INFLUENZA AGE 9 TO ADULT 8/1/2017 Allergies as of 7/17/2017  Review Complete On: 7/17/2017 By: Isac Nielsen LPN No Known Allergies Current Immunizations  Never Reviewed Name Date Pneumococcal Polysaccharide (PPSV-23) 8/28/2015  9:11 AM  
  
 Not reviewed this visit You Were Diagnosed With   
  
 Codes Comments Essential hypertension    -  Primary ICD-10-CM: I10 
ICD-9-CM: 401.9 Vitals BP Pulse Temp Resp Height(growth percentile) Weight(growth percentile) 125/84 88 98.4 °F (36.9 °C) (Oral) 16 6' 1\" (1.854 m) 133 lb (60.3 kg) SpO2 BMI Smoking Status 97% 17.55 kg/m2 Current Some Day Smoker Vitals History BMI and BSA Data Body Mass Index Body Surface Area  
 17.55 kg/m 2 1.76 m 2 Preferred Pharmacy Pharmacy Name Phone [address] [place], [place] - [address] [phone] Your Updated Medication List  
  
   
This list is accurate as of: 7/17/17  2:05 PM.  Always use your most recent med list.  
  
  
  
  
 * aspirin delayed-release 81 mg tablet TAKE 1 TABLET BY MOUTH DAILY * aspirin delayed-release 81 mg tablet TAKE 1 TABLET BY MOUTH DAILY ferrous sulfate 325 mg (65 mg iron) tablet Take  by mouth Daily (before breakfast). hydroCHLOROthiazide 12.5 mg tablet Commonly known as:  HYDRODIURIL  
TAKE 1 TABLET BY MOUTH DAILY * Notice: This list has 2 medication(s) that are the same as other medications prescribed for you. Read the directions carefully, and ask your doctor or other care provider to review them with you. We Performed the Following HGB & HCT [20959 CPT(R)] LIPID PANEL [29532 CPT(R)] METABOLIC PANEL, COMPREHENSIVE [72619 CPT(R)] TSH 3RD GENERATION [40695 CPT(R)] Patient Instructions DASH Diet: Care Instructions Your Care Instructions The DASH diet is an eating plan that can help lower your blood pressure. DASH stands for Dietary Approaches to Stop Hypertension. Hypertension is high blood pressure. The DASH diet focuses on eating foods that are high in calcium, potassium, and magnesium. These nutrients can lower blood pressure. The foods that are highest in these nutrients are fruits, vegetables, low-fat dairy products, nuts, seeds, and legumes. But taking calcium, potassium, and magnesium supplements instead of eating foods that are high in those nutrients does not have the same effect. The DASH diet also includes whole grains, fish, and poultry. The DASH diet is one of several lifestyle changes your doctor may recommend to lower your high blood pressure. Your doctor may also want you to decrease the amount of sodium in your diet. Lowering sodium while following the DASH diet can lower blood pressure even further than just the DASH diet alone. Follow-up care is a key part of your treatment and safety. Be sure to make and go to all appointments, and call your doctor if you are having problems. It's also a good idea to know your test results and keep a list of the medicines you take. How can you care for yourself at home? Following the DASH diet · Eat 4 to 5 servings of fruit each day. A serving is 1 medium-sized piece of fruit, ½ cup chopped or canned fruit, 1/4 cup dried fruit, or 4 ounces (½ cup) of fruit juice. Choose fruit more often than fruit juice. · Eat 4 to 5 servings of vegetables each day. A serving is 1 cup of lettuce or raw leafy vegetables, ½ cup of chopped or cooked vegetables, or 4 ounces (½ cup) of vegetable juice. Choose vegetables more often than vegetable juice. · Get 2 to 3 servings of low-fat and fat-free dairy each day. A serving is 8 ounces of milk, 1 cup of yogurt, or 1 ½ ounces of cheese. · Eat 6 to 8 servings of grains each day. A serving is 1 slice of bread, 1 ounce of dry cereal, or ½ cup of cooked rice, pasta, or cooked cereal. Try to choose whole-grain products as much as possible. · Limit lean meat, poultry, and fish to 2 servings each day. A serving is 3 ounces, about the size of a deck of cards. · Eat 4 to 5 servings of nuts, seeds, and legumes (cooked dried beans, lentils, and split peas) each week. A serving is 1/3 cup of nuts, 2 tablespoons of seeds, or ½ cup of cooked beans or peas. · Limit fats and oils to 2 to 3 servings each day. A serving is 1 teaspoon of vegetable oil or 2 tablespoons of salad dressing. · Limit sweets and added sugars to 5 servings or less a week. A serving is 1 tablespoon jelly or jam, ½ cup sorbet, or 1 cup of lemonade. · Eat less than 2,300 milligrams (mg) of sodium a day. If you limit your sodium to 1,500 mg a day, you can lower your blood pressure even more. Tips for success · Start small. Do not try to make dramatic changes to your diet all at once. You might feel that you are missing out on your favorite foods and then be more likely to not follow the plan. Make small changes, and stick with them. Once those changes become habit, add a few more changes. · Try some of the following: ¨ Make it a goal to eat a fruit or vegetable at every meal and at snacks. This will make it easy to get the recommended amount of fruits and vegetables each day. ¨ Try yogurt topped with fruit and nuts for a snack or healthy dessert. ¨ Add lettuce, tomato, cucumber, and onion to sandwiches. ¨ Combine a ready-made pizza crust with low-fat mozzarella cheese and lots of vegetable toppings. Try using tomatoes, squash, spinach, broccoli, carrots, cauliflower, and onions. ¨ Have a variety of cut-up vegetables with a low-fat dip as an appetizer instead of chips and dip. ¨ Sprinkle sunflower seeds or chopped almonds over salads. Or try adding chopped walnuts or almonds to cooked vegetables. ¨ Try some vegetarian meals using beans and peas. Add garbanzo or kidney beans to salads. Make burritos and tacos with mashed matson beans or black beans. Where can you learn more? Go to http://young-candis.info/. Enter J091 in the search box to learn more about \"DASH Diet: Care Instructions. \" Current as of: April 3, 2017 Content Version: 11.3 © 6498-5384 [employer]. Care instructions adapted under license by [employer] (which disclaims liability or warranty for this information). If you have questions about a medical condition or this instruction, always ask your healthcare professional. [address] [place] [address] any warranty or liability for your use of this information. Introducing [facility] & HEALTH SERVICES! Savita Truong introduces [employer] patient portal. Now you can access parts of your medical record, email your doctor's office, and request medication refills online. 1. In your internet browser, go to [email or web address] 2. Click on the First Time User? Click Here link in the Sign In box. You will see the New Member Sign Up page. 3. Enter your eSeek[employer] Access Code exactly as it appears below. You will not need to use this code after youve completed the sign-up process. If you do not sign up before the expiration date, you must request a new code. · [employer] Access Code: [email or web address] Expires: 10/15/2017  2:05 PM 
 
4. Enter the last four digits of your Social Security Number (xxxx) and Date of Birth (mm/dd/yyyy) as indicated and click Submit. You will be taken to the next sign-up page. 5. Create a eSeekers ID.  This will be your eSeekers login ID and cannot be changed, so think of one that is secure and easy to remember. 6. Create a Clearhaus password. You can change your password at any time. 7. Enter your Password Reset Question and Answer. This can be used at a later time if you forget your password. 8. Enter your e-mail address. You will receive e-mail notification when new information is available in [address] 9. Click Sign Up. You can now view and download portions of your medical record. 10. Click the Download Summary menu link to download a portable copy of your medical information. If you have questions, please visit the Frequently Asked Questions section of the [employer] website. Remember, [employer] is NOT to be used for urgent needs. For medical emergencies, dial 911. Now available from your iPhone and Android! Please provide this summary of care documentation to your next provider. Your primary care clinician is listed as Vikram Iraheta. If you have any questions after today's visit, please call [phone].

## 2017-07-18 LAB
ALBUMIN SERPL-MCNC: 4.7 G/DL (ref 3.5–5.5)
ALBUMIN/GLOB SERPL: 1.7 {RATIO} (ref 1.2–2.2)
ALP SERPL-CCNC: 69 IU/L (ref 39–117)
ALT SERPL-CCNC: 9 IU/L (ref 0–44)
AST SERPL-CCNC: 13 IU/L (ref 0–40)
BILIRUB SERPL-MCNC: 0.3 MG/DL (ref 0–1.2)
BUN SERPL-MCNC: 16 MG/DL (ref 6–24)
BUN/CREAT SERPL: 16 (ref 9–20)
CALCIUM SERPL-MCNC: 10.2 MG/DL (ref 8.7–10.2)
CHLORIDE SERPL-SCNC: 96 MMOL/L (ref 96–106)
CHOLEST SERPL-MCNC: 196 MG/DL (ref 100–199)
CO2 SERPL-SCNC: 24 MMOL/L (ref 18–29)
CREAT SERPL-MCNC: 1.03 MG/DL (ref 0.76–1.27)
GLOBULIN SER CALC-MCNC: 2.8 G/DL (ref 1.5–4.5)
GLUCOSE SERPL-MCNC: 81 MG/DL (ref 65–99)
HCT VFR BLD AUTO: 42.9 % (ref 37.5–51)
HDLC SERPL-MCNC: 61 MG/DL
HGB BLD-MCNC: 14.2 G/DL (ref 12.6–17.7)
LDLC SERPL CALC-MCNC: 120 MG/DL (ref 0–99)
POTASSIUM SERPL-SCNC: 4.6 MMOL/L (ref 3.5–5.2)
PROT SERPL-MCNC: 7.5 G/DL (ref 6–8.5)
SODIUM SERPL-SCNC: 139 MMOL/L (ref 134–144)
TRIGL SERPL-MCNC: 77 MG/DL (ref 0–149)
TSH SERPL DL<=0.005 MIU/L-ACNC: 0.56 UIU/ML (ref 0.45–4.5)
VLDLC SERPL CALC-MCNC: 15 MG/DL (ref 5–40)

## 2017-07-26 NOTE — PROGRESS NOTES
Chief Complaint   Patient presents with    Hypertension    Other     Paperwork to have Social Security come to his name     he is a 59y.o. year old male who presents for follow up of HTN. Patient with hx of right hemiparesis. He is not taking statin. He does complain of right foot numbness which he says is chronic. He has a hx of OA of neck and lower back. Patient is under weight. He continues to smoke. Patient denies HA, dizziness, SOB, CP, abdominal pain, dysuria, acute myalgias or arthralgias. BP Readings from Last 3 Encounters:   07/17/17 125/84   06/15/17 112/82   03/30/17 116/77     Wt Readings from Last 3 Encounters:   07/17/17 133 lb (60.3 kg)   06/15/17 130 lb 6.4 oz (59.1 kg)   03/30/17 126 lb 12.8 oz (57.5 kg)     Body mass index is 17.55 kg/(m^2). Medications:     Current Outpatient Prescriptions   Medication Sig    ferrous sulfate 325 mg (65 mg iron) tablet Take  by mouth Daily (before breakfast).  hydroCHLOROthiazide (HYDRODIURIL) 12.5 mg tablet TAKE 1 TABLET BY MOUTH DAILY    aspirin delayed-release 81 mg tablet TAKE 1 TABLET BY MOUTH DAILY    aspirin delayed-release 81 mg tablet TAKE 1 TABLET BY MOUTH DAILY     No current facility-administered medications for this visit.         Problem List:     Patient Active Problem List    Diagnosis Date Noted    Renal cyst 03/30/2017    Staghorn kidney stones 03/30/2017    Cervical osteoarthritis 08/27/2015    Acute right hemiparesis ([address] 08/27/2015    Arthritis of hand 06/22/2015    HTN (hypertension) 04/26/2012    Lumbar back pain     Neck pain     Chronic pain        Medical History:     Past Medical History:   Diagnosis Date    Cervical osteoarthritis 8/27/2015    Chronic pain     neck and back    Hypertension     Lumbar back pain     Neck pain     Staghorn kidney stones 3/30/2017    Staghorn kidney stones 3/30/2017    A large staghorn calculus in superior pole of left kidney 2 cm x 1.6 cm       Allergies:   No Known Allergies    Surgical History:     Past Surgical History:   Procedure Laterality Date    HX ORTHOPAEDIC      left knee       Social History:     Social History     Social History    Marital status:      Spouse name: N/A    Number of children: N/A    Years of education: N/A     Social History Main Topics    Smoking status: Current Some Day Smoker     Packs/day: 1.00    Smokeless tobacco: None    Alcohol use 0.0 oz/week     0 Standard drinks or equivalent per week      Comment: occassioally    Drug use: No    Sexual activity: Yes     Partners: Female     Other Topics Concern    None     Social History Narrative          Patient Active Problem List   Diagnosis Code    Lumbar back pain M54.5    Neck pain M54.2    Chronic pain G89.29    HTN (hypertension) I10    Arthritis of hand M19.049    Cervical osteoarthritis M47.812    Acute right hemiparesis (HCC) G81.91    Renal cyst N28.1    Staghorn kidney stones N20.0     Past Surgical History:   Procedure Laterality Date    HX ORTHOPAEDIC      left knee     Social History     Social History    Marital status:      Spouse name: N/A    Number of children: N/A    Years of education: N/A     Occupational History    Not on file. Social History Main Topics    Smoking status: Current Some Day Smoker     Packs/day: 1.00    Smokeless tobacco: Not on file    Alcohol use 0.0 oz/week     0 Standard drinks or equivalent per week      Comment: occassioally    Drug use: No    Sexual activity: Yes     Partners: Female     Other Topics Concern    Not on file     Social History Narrative     Family History   Problem Relation Age of Onset    Cancer Mother     Cancer Father     Stroke Father      Current Outpatient Prescriptions   Medication Sig    ferrous sulfate 325 mg (65 mg iron) tablet Take  by mouth Daily (before breakfast).     hydroCHLOROthiazide (HYDRODIURIL) 12.5 mg tablet TAKE 1 TABLET BY MOUTH DAILY    aspirin delayed-release 81 mg tablet TAKE 1 TABLET BY MOUTH DAILY    aspirin delayed-release 81 mg tablet TAKE 1 TABLET BY MOUTH DAILY     No current facility-administered medications for this visit. No Known Allergies    Review of Systems:  Constitutional: feeling well, denies fatigue, malaise  Skin: Negative for rash or lesion  HEENT: Negative for acute hearing or vision changes  Respiratory: Negative for cough, wheezing or SOB  Cardiovascular: Negative for chest pain, dizziness or palpitations  Gastrointestinal: Negative for nausea or abdominal pain  Genital/urinary: Negative for dysuria or voiding dysfunction  Musculoskeletal: Negative for acute myalgia or arthralgia   Neurological: see HPI, Negative for HA, weakness or paresthesia  Psychlogical: Negative for depression or anxiety     Vitals:    07/17/17 1318   BP: 125/84   Pulse: 88   Resp: 16   Temp: 98.4 °F (36.9 °C)   TempSrc: Oral   SpO2: 97%   Weight: 133 lb (60.3 kg)   Height: 6' 1\" (1.854 m)       Physical Examination:  General: Well developed, well nourished, in no acute distress  Skin: Warm and dry, no rash or lesion appreciated  Head: Normocephalic, atraumatic  Eyes: Sclera clear, EOMI  Neck: Normal range of motion  Respiratory: Clear to auscultation bilaterally with symmetrical effort  Cardiovascular: Normal S1, S2, Regular rate and rhythm  Extremities: Full range of motion, Normal gait  Neurological: right hemiparesis  Psychological: Active, alert and oriented. Affect appropriate     Diagnoses and all orders for this visit:    1. Essential hypertension  -     TSH 3RD GENERATION  -     LIPID PANEL  -     METABOLIC PANEL, COMPREHENSIVE  -     HGB & HCT       Strongly advised patient to stop all use of tobacco products. I have discussed the diagnosis with the patient and the intended plan as seen in the above orders. The patient expresses understanding and agreement with our plan of care. All of the patient's questions were answered to apparent satisfaction.  The patient has received an after-visit summary. The patient knows to call our office if there are any questions or concerns regarding diagnosis and treatment plans. I have discussed medication side effects and warnings with the patient as well.         Follow-up Disposition: Not on File

## 2017-09-18 ENCOUNTER — PATIENT OUTREACH (OUTPATIENT)
Dept: FAMILY MEDICINE CLINIC | Age: 57
End: 2017-09-18

## 2017-09-18 NOTE — PROGRESS NOTES
This episode closed: NNTOCIP: 5/28/2017-6/2/2017:  [facility] Doctors Failure to Thrive, anemia,chronic, S/P cystoscopy, S/P polypectomy. Patient followed up with PCP 6/15/2017 and 7/2017: Please note PCP documentation and plan of care. Patient encouraged to please stop smoking. Patient followed up with Urology after procedures. Next appointment with PCP 1/17/18.

## 2018-01-25 ENCOUNTER — OFFICE VISIT (OUTPATIENT)
Dept: FAMILY MEDICINE CLINIC | Age: 58
End: 2018-01-25

## 2018-01-25 VITALS
OXYGEN SATURATION: 98 % | RESPIRATION RATE: 18 BRPM | DIASTOLIC BLOOD PRESSURE: 85 MMHG | HEIGHT: 73 IN | SYSTOLIC BLOOD PRESSURE: 119 MMHG | TEMPERATURE: 97.4 F | WEIGHT: 132.2 LBS | HEART RATE: 68 BPM | BODY MASS INDEX: 17.52 KG/M2

## 2018-01-25 DIAGNOSIS — I10 ESSENTIAL HYPERTENSION: Primary | ICD-10-CM

## 2018-01-25 DIAGNOSIS — Z72.0 TOBACCO USE: ICD-10-CM

## 2018-01-25 DIAGNOSIS — R63.6 UNDERWEIGHT: ICD-10-CM

## 2018-01-25 DIAGNOSIS — R63.0 DECREASED APPETITE: ICD-10-CM

## 2018-01-25 DIAGNOSIS — R35.1 NOCTURIA: ICD-10-CM

## 2018-01-25 RX ORDER — LANOLIN ALCOHOL/MO/W.PET/CERES
325 CREAM (GRAM) TOPICAL
Qty: 30 TAB | Refills: 5 | Status: SHIPPED | OUTPATIENT
Start: 2018-01-25

## 2018-01-25 RX ORDER — ASPIRIN 81 MG/1
TABLET ORAL
Qty: 90 TAB | Refills: 3 | Status: SHIPPED | OUTPATIENT
Start: 2018-01-25 | End: 2019-02-21 | Stop reason: SDUPTHER

## 2018-01-25 RX ORDER — DRONABINOL 2.5 MG/1
2.5 CAPSULE ORAL 2 TIMES DAILY
Qty: 60 CAP | Refills: 0 | Status: SHIPPED | OUTPATIENT
Start: 2018-01-25 | End: 2020-04-15

## 2018-01-25 RX ORDER — HYDROCHLOROTHIAZIDE 12.5 MG/1
TABLET ORAL
Qty: 30 TAB | Refills: 5 | Status: SHIPPED | OUTPATIENT
Start: 2018-01-25 | End: 2018-07-28 | Stop reason: SDUPTHER

## 2018-01-25 NOTE — PATIENT INSTRUCTIONS

## 2018-01-25 NOTE — MR AVS SNAPSHOT
[address] [address] 
[phone] Patient: Rosita Salinas Sr. MRN: [id number] HCK:[birth year] Visit Information Date & Time Provider Department Dept. Phone Encounter #  
 1/25/2018  9:00 AM Baltazar Boles, 7 Savita Anderson [id number] Upcoming Health Maintenance Date Due DTaP/Tdap/Td series (1 - Tdap) 4/29/1979 COLONOSCOPY 3/15/2017 Influenza Age 5 to Adult 8/1/2017 Allergies as of 1/25/2018  Review Complete On: 1/25/2018 By: Meghan Hicks No Known Allergies Current Immunizations  Never Reviewed Name Date Pneumococcal Polysaccharide (PPSV-23) 8/28/2015  9:11 AM  
  
 Not reviewed this visit You Were Diagnosed With   
  
 Codes Comments Essential hypertension    -  Primary ICD-10-CM: I10 
ICD-9-CM: 401.9 Underweight     ICD-10-CM: R63.6 ICD-9-CM: 783.22 Decreased appetite     ICD-10-CM: R63.0 ICD-9-CM: 783.0 Tobacco use     ICD-10-CM: Z72.0 ICD-9-CM: 305.1 Vitals BP Pulse Temp Resp Height(growth percentile) Weight(growth percentile) 119/85 (BP 1 Location: Right arm, BP Patient Position: Sitting) 68 97.4 °F (36.3 °C) (Oral) 18 6' 1\" (1.854 m) 132 lb 3.2 oz (60 kg) SpO2 BMI Smoking Status 98% 17.44 kg/m2 Current Some Day Smoker Vitals History BMI and BSA Data Body Mass Index Body Surface Area  
 17.44 kg/m 2 1.76 m 2 Preferred Pharmacy Pharmacy Name Phone [address] [address] [phone] Your Updated Medication List  
  
   
This list is accurate as of: 1/25/18  9:50 AM.  Always use your most recent med list.  
  
  
  
  
 aspirin delayed-release 81 mg tablet TAKE ONE TABLET BY MOUTH DAILY  
  
 dronabinol 2.5 mg capsule Commonly known as:  Laurel Linker Take 1 Cap by mouth two (2) times a day. Max Daily Amount: 5 mg.  For decreased appetite and low BMI. ferrous sulfate 325 mg (65 mg iron) tablet Take 1 Tab by mouth Daily (before breakfast). hydroCHLOROthiazide 12.5 mg tablet Commonly known as:  HYDRODIURIL  
TAKE 1 TABLET BY MOUTH DAILY Prescriptions Printed Refills  
 dronabinol (MARINOL) 2.5 mg capsule 0 Sig: Take 1 Cap by mouth two (2) times a day. Max Daily Amount: 5 mg. For decreased appetite and low BMI. Class: Print Route: Oral  
  
Prescriptions Sent to Pharmacy Refills  
 ferrous sulfate 325 mg (65 mg iron) tablet 5 Sig: Take 1 Tab by mouth Daily (before breakfast). Class: Normal  
 Pharmacy: [address] [place] [address] #: [phone] Route: Oral  
 hydroCHLOROthiazide (HYDRODIURIL) 12.5 mg tablet 5 Sig: TAKE 1 TABLET BY MOUTH DAILY Class: Normal  
 Pharmacy: [address] [place] [address] Ph #: [phone]  
 aspirin delayed-release 81 mg tablet 3 Sig: TAKE ONE TABLET BY MOUTH DAILY Class: Normal  
 Pharmacy: [address] [place] [address] Ph #: [phone] We Performed the Following CBC WITH AUTOMATED DIFF [43933 CPT(R)] LIPID PANEL [44833 CPT(R)] MAGNESIUM [id number] CPT(R)] METABOLIC PANEL, COMPREHENSIVE [53830 CPT(R)] TSH 3RD GENERATION [70125 CPT(R)] Patient Instructions Learning About Benefits From Quitting Smoking How does quitting smoking make you healthier? If you're thinking about quitting smoking, you may have a few reasons to be smoke-free. Your health may be one of them. · When you quit smoking, you lower your risks for cancer, lung disease, heart attack, stroke, blood vessel disease, and blindness from macular degeneration. · When you're smoke-free, you get sick less often, and you heal faster. You are less likely to get colds, flu, bronchitis, and pneumonia. · As a nonsmoker, you may find that your mood is better and you are less stressed. When and how will you feel healthier? Quitting has real health benefits that start from day 1 of being smoke-free. And the longer you stay smoke-free, the healthier you get and the better you feel. The first hours · After just 20 minutes, your blood pressure and heart rate go down. That means there's less stress on your heart and blood vessels. · Within 12 hours, the level of carbon monoxide in your blood drops back to normal. That makes room for more oxygen. With more oxygen in your body, you may notice that you have more energy than when you smoked. After 2 weeks · Your lungs start to work better. · Your risk of heart attack starts to drop. After 1 month · When your lungs are clear, you cough less and breathe deeper, so it's easier to be active. · Your sense of taste and smell return. That means you can enjoy food more than you have since you started smoking. Over the years · After 1 year, your risk of heart disease is half what it would be if you kept smoking. · After 5 years, your risk of stroke starts to shrink. Within a few years after that, it's about the same as if you'd never smoked. · After 10 years, your risk of dying from lung cancer is cut by about half. And your risk for many other types of cancer is lower too. How would quitting help others in your life? When you quit smoking, you improve the health of everyone who now breathes in your smoke. · Their heart, lung, and cancer risks drop, much like yours. · They are sick less. For babies and small children, living smoke-free means they're less likely to have ear infections, pneumonia, and bronchitis. · If you're a woman who is or will be pregnant someday, quitting smoking means a healthier . · Children who are close to you are less likely to become adult smokers. Where can you learn more? Go to http://young-candis.info/. Enter 052 [id number] 11 in the search box to learn more about \"Learning About Benefits From Quitting Smoking. \" Current as of: March 20, 2017 Content Version: 11.4 © 4272-1158 Healthwise, Incorporated. Care instructions adapted under license by [employer] (which disclaims liability or warranty for this information). If you have questions about a medical condition or this instruction, always ask your healthcare professional. [address] [address] any warranty or liability for your use of this information. Introducing [facility]! [employer] introduces [employer] patient portal. Now you can access parts of your medical record, email your doctor's office, and request medication refills online. 1. In your internet browser, go to [email or web address] 2. Click on the First Time User? Click Here link in the Sign In box. You will see the New Member Sign Up page. 3. Enter your [employer] Access Code exactly as it appears below. You will not need to use this code after youve completed the sign-up process. If you do not sign up before the expiration date, you must request a new code. · [employer] Access Code: [email or web address] Expires: 4/25/2018  8:53 AM 
 
4. Enter the last four digits of your Social Security Number (xxxx) and Date of Birth (mm/dd/yyyy) as indicated and click Submit. You will be taken to the next sign-up page. 5. Create a [employer]an ID. This will be your [employer] login ID and cannot be changed, so think of one that is secure and easy to remember. 6. Create a [employer] password. You can change your password at any time. 7. Enter your Password Reset Question and Answer. This can be used at a later time if you forget your password. 8. Enter your e-mail address. You will receive e-mail notification when new information is available in [address] 9. Click Sign Up. You can now view and download portions of your medical record. 10. Click the Download Summary menu link to download a portable copy of your medical information. If you have questions, please visit the Frequently Asked Questions section of the HelpAround website. Remember, HelpAround is NOT to be used for urgent needs. For medical emergencies, dial 911. Now available from your iPhone and Android! Please provide this summary of care documentation to your next provider. Your primary care clinician is listed as Eli Huagns. If you have any questions after today's visit, please call [phone].

## 2018-01-25 NOTE — PROGRESS NOTES
Reviewed record in preparation for visit and have necessary documentation  Pt did not bring medication to office visit for review  Goals that were addressed and/or need to be completed during or after this appointment include   Health Maintenance Due   Topic Date Due    DTaP/Tdap/Td series (1 - Tdap) 04/29/1979    COLONOSCOPY  03/15/2017    Influenza Age 9 to Adult  08/01/2017

## 2018-01-26 LAB
ALBUMIN SERPL-MCNC: 4.6 G/DL (ref 3.5–5.5)
ALBUMIN/GLOB SERPL: 1.6 {RATIO} (ref 1.2–2.2)
ALP SERPL-CCNC: 55 IU/L (ref 39–117)
ALT SERPL-CCNC: 7 IU/L (ref 0–44)
AST SERPL-CCNC: 14 IU/L (ref 0–40)
BASOPHILS # BLD AUTO: 0.1 X10E3/UL (ref 0–0.2)
BASOPHILS NFR BLD AUTO: 1 %
BILIRUB SERPL-MCNC: 0.3 MG/DL (ref 0–1.2)
BUN SERPL-MCNC: 19 MG/DL (ref 6–24)
BUN/CREAT SERPL: 13 (ref 9–20)
CALCIUM SERPL-MCNC: 10.2 MG/DL (ref 8.7–10.2)
CHLORIDE SERPL-SCNC: 97 MMOL/L (ref 96–106)
CHOLEST SERPL-MCNC: 190 MG/DL (ref 100–199)
CO2 SERPL-SCNC: 26 MMOL/L (ref 18–29)
CREAT SERPL-MCNC: 1.45 MG/DL (ref 0.76–1.27)
EOSINOPHIL # BLD AUTO: 0.1 X10E3/UL (ref 0–0.4)
EOSINOPHIL NFR BLD AUTO: 1 %
ERYTHROCYTE [DISTWIDTH] IN BLOOD BY AUTOMATED COUNT: 13.2 % (ref 12.3–15.4)
GFR SERPLBLD CREATININE-BSD FMLA CKD-EPI: 52 ML/MIN/1.73
GFR SERPLBLD CREATININE-BSD FMLA CKD-EPI: 61 ML/MIN/1.73
GLOBULIN SER CALC-MCNC: 2.8 G/DL (ref 1.5–4.5)
GLUCOSE SERPL-MCNC: 89 MG/DL (ref 65–99)
HCT VFR BLD AUTO: 41.5 % (ref 37.5–51)
HDLC SERPL-MCNC: 52 MG/DL
HGB BLD-MCNC: 14.1 G/DL (ref 13–17.7)
IMM GRANULOCYTES # BLD: 0 X10E3/UL (ref 0–0.1)
IMM GRANULOCYTES NFR BLD: 0 %
LDLC SERPL CALC-MCNC: 128 MG/DL (ref 0–99)
LYMPHOCYTES # BLD AUTO: 1.9 X10E3/UL (ref 0.7–3.1)
LYMPHOCYTES NFR BLD AUTO: 36 %
MAGNESIUM SERPL-MCNC: 2.1 MG/DL (ref 1.6–2.3)
MCH RBC QN AUTO: 31.8 PG (ref 26.6–33)
MCHC RBC AUTO-ENTMCNC: 34 G/DL (ref 31.5–35.7)
MCV RBC AUTO: 94 FL (ref 79–97)
MONOCYTES # BLD AUTO: 0.3 X10E3/UL (ref 0.1–0.9)
MONOCYTES NFR BLD AUTO: 6 %
NEUTROPHILS # BLD AUTO: 3 X10E3/UL (ref 1.4–7)
NEUTROPHILS NFR BLD AUTO: 56 %
PLATELET # BLD AUTO: 225 X10E3/UL (ref 150–379)
POTASSIUM SERPL-SCNC: 5 MMOL/L (ref 3.5–5.2)
PROT SERPL-MCNC: 7.4 G/DL (ref 6–8.5)
PSA SERPL-MCNC: 0.5 NG/ML (ref 0–4)
RBC # BLD AUTO: 4.43 X10E6/UL (ref 4.14–5.8)
REFLEX CRITERIA: NORMAL
SODIUM SERPL-SCNC: 138 MMOL/L (ref 134–144)
TRIGL SERPL-MCNC: 49 MG/DL (ref 0–149)
TSH SERPL DL<=0.005 MIU/L-ACNC: 0.66 UIU/ML (ref 0.45–4.5)
VLDLC SERPL CALC-MCNC: 10 MG/DL (ref 5–40)
WBC # BLD AUTO: 5.3 X10E3/UL (ref 3.4–10.8)

## 2018-01-29 NOTE — PROGRESS NOTES
Chief Complaint   Patient presents with    Hypertension    Decreased Appetite     wants to know if he can get back on iron pills     he is a 59y.o. year old male who presents for follow up of HTN. Patient with hx of right hemiparesis. He is not taking statin. Patient denies HA, dizziness, SOB, CP, abdominal pain, dysuria, acute myalgias or arthralgias. He does complain of right foot numbness which he says is chronic. He has a hx of OA of neck and lower back. Patient is under weight. He continues to smoke. BP Readings from Last 3 Encounters:   01/25/18 119/85   07/17/17 125/84   06/15/17 112/82     Wt Readings from Last 3 Encounters:   01/25/18 132 lb 3.2 oz (60 kg)   07/17/17 133 lb (60.3 kg)   06/15/17 130 lb 6.4 oz (59.1 kg)     Body mass index is 17.44 kg/(m^2). Medications:     Current Outpatient Prescriptions   Medication Sig    ferrous sulfate 325 mg (65 mg iron) tablet Take 1 Tab by mouth Daily (before breakfast).  dronabinol (MARINOL) 2.5 mg capsule Take 1 Cap by mouth two (2) times a day. Max Daily Amount: 5 mg. For decreased appetite and low BMI.  hydroCHLOROthiazide (HYDRODIURIL) 12.5 mg tablet TAKE 1 TABLET BY MOUTH DAILY    aspirin delayed-release 81 mg tablet TAKE ONE TABLET BY MOUTH DAILY     No current facility-administered medications for this visit.         Problem List:     Patient Active Problem List    Diagnosis Date Noted    Renal cyst 03/30/2017    Staghorn kidney stones 03/30/2017    Cervical osteoarthritis 08/27/2015    Acute right hemiparesis ([address] 08/27/2015    Arthritis of hand 06/22/2015    HTN (hypertension) 04/26/2012    Lumbar back pain     Neck pain     Chronic pain        Medical History:     Past Medical History:   Diagnosis Date    Cervical osteoarthritis 8/27/2015    Chronic pain     neck and back    Hypertension     Lumbar back pain     Neck pain     Staghorn kidney stones 3/30/2017    Staghorn kidney stones 3/30/2017    A large staghorn calculus in superior pole of left kidney 2 cm x 1.6 cm       Allergies:   No Known Allergies    Surgical History:     Past Surgical History:   Procedure Laterality Date    HX ORTHOPAEDIC      left knee       Social History:     Social History     Social History    Marital status:      Spouse name: N/A    Number of children: N/A    Years of education: N/A     Social History Main Topics    Smoking status: Current Some Day Smoker     Packs/day: 1.00    Smokeless tobacco: None    Alcohol use 0.0 oz/week     0 Standard drinks or equivalent per week      Comment: occassioally    Drug use: No    Sexual activity: Yes     Partners: Female     Other Topics Concern    None     Social History Narrative          Patient Active Problem List   Diagnosis Code    Lumbar back pain M54.5    Neck pain M54.2    Chronic pain G89.29    HTN (hypertension) I10    Arthritis of hand M19.049    Cervical osteoarthritis M47.812    Acute right hemiparesis (HCC) G81.91    Renal cyst N28.1    Staghorn kidney stones N20.0     Past Surgical History:   Procedure Laterality Date    HX ORTHOPAEDIC      left knee     Social History     Social History    Marital status:      Spouse name: N/A    Number of children: N/A    Years of education: N/A     Occupational History    Not on file. Social History Main Topics    Smoking status: Current Some Day Smoker     Packs/day: 1.00    Smokeless tobacco: Not on file    Alcohol use 0.0 oz/week     0 Standard drinks or equivalent per week      Comment: occassioally    Drug use: No    Sexual activity: Yes     Partners: Female     Other Topics Concern    Not on file     Social History Narrative     Family History   Problem Relation Age of Onset    Cancer Mother     Cancer Father     Stroke Father      Current Outpatient Prescriptions   Medication Sig    ferrous sulfate 325 mg (65 mg iron) tablet Take 1 Tab by mouth Daily (before breakfast).     dronabinol (MARINOL) 2.5 mg capsule Take 1 Cap by mouth two (2) times a day. Max Daily Amount: 5 mg. For decreased appetite and low BMI.  hydroCHLOROthiazide (HYDRODIURIL) 12.5 mg tablet TAKE 1 TABLET BY MOUTH DAILY    aspirin delayed-release 81 mg tablet TAKE ONE TABLET BY MOUTH DAILY     No current facility-administered medications for this visit. No Known Allergies    Review of Systems:  Constitutional: feeling well, denies fatigue, malaise  Skin: Negative for rash or lesion  HEENT: Negative for acute hearing or vision changes  Respiratory: Negative for cough, wheezing or SOB  Cardiovascular: Negative for chest pain, dizziness or palpitations  Gastrointestinal: Negative for nausea or abdominal pain  Genital/urinary: Negative for dysuria or voiding dysfunction  Musculoskeletal: Negative for acute myalgia or arthralgia   Neurological: see HPI, Negative for HA, weakness or paresthesia  Psychlogical: Negative for depression or anxiety     Vitals:    01/25/18 0905   BP: 119/85   Pulse: 68   Resp: 18   Temp: 97.4 °F (36.3 °C)   TempSrc: Oral   SpO2: 98%   Weight: 132 lb 3.2 oz (60 kg)   Height: 6' 1\" (1.854 m)       Physical Examination:  General: Well developed, well nourished, in no acute distress  Skin: Warm and dry, no rash or lesion appreciated  Head: Normocephalic, atraumatic  Eyes: Sclera clear, EOMI  Neck: Normal range of motion  Respiratory: Clear to auscultation bilaterally with symmetrical effort  Cardiovascular: Normal S1, S2, Regular rate and rhythm  Extremities: Full range of motion, Normal gait  Neurological: right hemiparesis  Psychological: Active, alert and oriented. Affect appropriate     Diagnoses and all orders for this visit:    1.  Essential hypertension  -     hydroCHLOROthiazide (HYDRODIURIL) 12.5 mg tablet; TAKE 1 TABLET BY MOUTH DAILY  -     aspirin delayed-release 81 mg tablet; TAKE ONE TABLET BY MOUTH DAILY  -     LIPID PANEL  -     CBC WITH AUTOMATED DIFF  -     MAGNESIUM  -     METABOLIC PANEL, COMPREHENSIVE  -     TSH 3RD GENERATION    2. Underweight  -     ferrous sulfate 325 mg (65 mg iron) tablet; Take 1 Tab by mouth Daily (before breakfast). -     CBC WITH AUTOMATED DIFF  -     METABOLIC PANEL, COMPREHENSIVE  -     PSA W/ REFLX FREE PSA    3. Decreased appetite  -     dronabinol (MARINOL) 2.5 mg capsule; Take 1 Cap by mouth two (2) times a day. Max Daily Amount: 5 mg. For decreased appetite and low BMI. 4. Tobacco use  -     aspirin delayed-release 81 mg tablet; TAKE ONE TABLET BY MOUTH DAILY       Strongly advised patient to stop all use of tobacco products. I have discussed the diagnosis with the patient and the intended plan as seen in the above orders. The patient expresses understanding and agreement with our plan of care. All of the patient's questions were answered to apparent satisfaction. The patient has received an after-visit summary. The patient knows to call our office if there are any questions or concerns regarding diagnosis and treatment plans. I have discussed medication side effects and warnings with the patient as well. Follow-up Disposition:  Return in about 6 months (around 7/25/2018).

## 2018-08-15 ENCOUNTER — OFFICE VISIT (OUTPATIENT)
Dept: FAMILY MEDICINE CLINIC | Age: 58
End: 2018-08-15

## 2018-08-15 VITALS
HEART RATE: 60 BPM | BODY MASS INDEX: 16.7 KG/M2 | DIASTOLIC BLOOD PRESSURE: 98 MMHG | SYSTOLIC BLOOD PRESSURE: 147 MMHG | TEMPERATURE: 97.9 F | RESPIRATION RATE: 20 BRPM | HEIGHT: 73 IN | OXYGEN SATURATION: 99 % | WEIGHT: 126 LBS

## 2018-08-15 DIAGNOSIS — I10 ESSENTIAL HYPERTENSION: Primary | ICD-10-CM

## 2018-08-15 DIAGNOSIS — Z86.2 HISTORY OF ANEMIA: ICD-10-CM

## 2018-08-15 DIAGNOSIS — Z72.0 TOBACCO ABUSE: ICD-10-CM

## 2018-08-15 RX ORDER — HYDROCHLOROTHIAZIDE 12.5 MG/1
TABLET ORAL
Qty: 30 TAB | Refills: 5 | Status: SHIPPED | OUTPATIENT
Start: 2018-08-15 | End: 2019-03-01 | Stop reason: SDUPTHER

## 2018-08-15 NOTE — PATIENT INSTRUCTIONS

## 2018-08-15 NOTE — MR AVS SNAPSHOT
Yamini Denisa 
 
 
 [address] [address] 
[phone] Patient: Binta Shankar Sr. MRN: [id number] XAY:[id number] Visit Information Date & Time Provider Department Dept. Phone Encounter #  
 8/15/2018  8:20 AM Marylou Rodriguez  [facility] [phone] [id number] Follow-up Instructions Return in about 6 months (around 2/15/2019). Upcoming Health Maintenance Date Due DTaP/Tdap/Td series (1 - Tdap) 4/29/1979 COLONOSCOPY 3/15/2017 ZOSTER VACCINE AGE 60> 2/28/2018 Influenza Age 5 to Adult 8/1/2018 Allergies as of 8/15/2018  Review Complete On: 8/15/2018 By: Vitaliy Najera No Known Allergies Current Immunizations  Never Reviewed Name Date Pneumococcal Polysaccharide (PPSV-23) 8/28/2015  9:11 AM  
  
 Not reviewed this visit You Were Diagnosed With   
  
 Codes Comments Essential hypertension    -  Primary ICD-10-CM: I10 
ICD-9-CM: 401.9 History of anemia     ICD-10-CM: Z86.2 ICD-9-CM: V12.3 Vitals BP Pulse Temp Resp Height(growth percentile) Weight(growth percentile) (!) 147/98 60 97.9 °F (36.6 °C) (Oral) 20 6' 1\" (1.854 m) 126 lb (57.2 kg) SpO2 BMI Smoking Status 99% 16.62 kg/m2 Current Some Day Smoker Vitals History BMI and BSA Data Body Mass Index Body Surface Area  
 16.62 kg/m 2 1.72 m 2 Preferred Pharmacy Pharmacy Name Phone [address] [place], [place] - [address] [phone] Your Updated Medication List  
  
   
This list is accurate as of 8/15/18  9:32 AM.  Always use your most recent med list.  
  
  
  
  
 aspirin delayed-release 81 mg tablet TAKE ONE TABLET BY MOUTH DAILY  
  
 dronabinol 2.5 mg capsule Commonly known as:  Amaryllis Pies Take 1 Cap by mouth two (2) times a day. Max Daily Amount: 5 mg. For decreased appetite and low BMI. ferrous sulfate 325 mg (65 mg iron) tablet Take 1 Tab by mouth Daily (before breakfast). hydroCHLOROthiazide 12.5 mg tablet Commonly known as:  HYDRODIURIL  
TAKE ONE TABLET BY MOUTH EVERY DAY Prescriptions Sent to Pharmacy Refills  
 hydroCHLOROthiazide (HYDRODIURIL) 12.5 mg tablet 5 Sig: TAKE ONE TABLET BY MOUTH EVERY DAY Class: Normal  
 Pharmacy: [address] [place] [address] #: [phone] We Performed the Following HGB & HCT [32200 CPT(R)] LIPID PANEL [67795 CPT(R)] METABOLIC PANEL, COMPREHENSIVE [24601 CPT(R)] TSH 3RD GENERATION [52515 CPT(R)] Follow-up Instructions Return in about 6 months (around 2/15/2019). Patient Instructions DASH Diet: Care Instructions Your Care Instructions The DASH diet is an eating plan that can help lower your blood pressure. DASH stands for Dietary Approaches to Stop Hypertension. Hypertension is high blood pressure. The DASH diet focuses on eating foods that are high in calcium, potassium, and magnesium. These nutrients can lower blood pressure. The foods that are highest in these nutrients are fruits, vegetables, low-fat dairy products, nuts, seeds, and legumes. But taking calcium, potassium, and magnesium supplements instead of eating foods that are high in those nutrients does not have the same effect. The DASH diet also includes whole grains, fish, and poultry. The DASH diet is one of several lifestyle changes your doctor may recommend to lower your high blood pressure. Your doctor may also want you to decrease the amount of sodium in your diet. Lowering sodium while following the DASH diet can lower blood pressure even further than just the DASH diet alone. Follow-up care is a key part of your treatment and safety.  Be sure to make and go to all appointments, and call your doctor if you are having problems. It's also a good idea to know your test results and keep a list of the medicines you take. How can you care for yourself at home? Following the DASH diet · Eat 4 to 5 servings of fruit each day. A serving is 1 medium-sized piece of fruit, ½ cup chopped or canned fruit, 1/4 cup dried fruit, or 4 ounces (½ cup) of fruit juice. Choose fruit more often than fruit juice. · Eat 4 to 5 servings of vegetables each day. A serving is 1 cup of lettuce or raw leafy vegetables, ½ cup of chopped or cooked vegetables, or 4 ounces (½ cup) of vegetable juice. Choose vegetables more often than vegetable juice. · Get 2 to 3 servings of low-fat and fat-free dairy each day. A serving is 8 ounces of milk, 1 cup of yogurt, or 1 ½ ounces of cheese. · Eat 6 to 8 servings of grains each day. A serving is 1 slice of bread, 1 ounce of dry cereal, or ½ cup of cooked rice, pasta, or cooked cereal. Try to choose whole-grain products as much as possible. · Limit lean meat, poultry, and fish to 2 servings each day. A serving is 3 ounces, about the size of a deck of cards. · Eat 4 to 5 servings of nuts, seeds, and legumes (cooked dried beans, lentils, and split peas) each week. A serving is 1/3 cup of nuts, 2 tablespoons of seeds, or ½ cup of cooked beans or peas. · Limit fats and oils to 2 to 3 servings each day. A serving is 1 teaspoon of vegetable oil or 2 tablespoons of salad dressing. · Limit sweets and added sugars to 5 servings or less a week. A serving is 1 tablespoon jelly or jam, ½ cup sorbet, or 1 cup of lemonade. · Eat less than 2,300 milligrams (mg) of sodium a day. If you limit your sodium to 1,500 mg a day, you can lower your blood pressure even more. Tips for success · Start small. Do not try to make dramatic changes to your diet all at once. You might feel that you are missing out on your favorite foods and then be more likely to not follow the plan.  Make small changes, and stick with them. Once those changes become habit, add a few more changes. · Try some of the following: ¨ Make it a goal to eat a fruit or vegetable at every meal and at snacks. This will make it easy to get the recommended amount of fruits and vegetables each day. ¨ Try yogurt topped with fruit and nuts for a snack or healthy dessert. ¨ Add lettuce, tomato, cucumber, and onion to sandwiches. ¨ Combine a ready-made pizza crust with low-fat mozzarella cheese and lots of vegetable toppings. Try using tomatoes, squash, spinach, broccoli, carrots, cauliflower, and onions. ¨ Have a variety of cut-up vegetables with a low-fat dip as an appetizer instead of chips and dip. ¨ Sprinkle sunflower seeds or chopped almonds over salads. Or try adding chopped walnuts or almonds to cooked vegetables. ¨ Try some vegetarian meals using beans and peas. Add garbanzo or kidney beans to salads. Make burritos and tacos with mashed matson beans or black beans. Where can you learn more? Go to http://youngHana Biosciencescandis.info/. Enter U985 in the search box to learn more about \"DASH Diet: Care Instructions. \" Current as of: December 6, 2017 Content Version: 11.7 © 9942-6815 [employer]. Care instructions adapted under license by Namely (which disclaims liability or warranty for this information). If you have questions about a medical condition or this instruction, always ask your healthcare professional. [place] [address] any warranty or liability for your use of this information. Introducing [facility]! [employer] introduces [employer] patient portal. Now you can access parts of your medical record, email your doctor's office, and request medication refills online. 1. In your internet browser, go to [email or web address] 2. Click on the First Time User? Click Here link in the Sign In box. You will see the New Member Sign Up page. 3. Enter your Sitrion Access Code exactly as it appears below. You will not need to use this code after youve completed the sign-up process. If you do not sign up before the expiration date, you must request a new code. · Sitrion Access Code: [email or web address] Expires: 11/13/2018  8:08 AM 
 
4. Enter the last four digits of your Social Security Number (xxxx) and Date of Birth (mm/dd/yyyy) as indicated and click Submit. You will be taken to the next sign-up page. 5. Create a Sitrion ID. This will be your Sitrion login ID and cannot be changed, so think of one that is secure and easy to remember. 6. Create a Sitrion password. You can change your password at any time. 7. Enter your Password Reset Question and Answer. This can be used at a later time if you forget your password. 8. Enter your e-mail address. You will receive e-mail notification when new information is available in [address]. 9. Click Sign Up. You can now view and download portions of your medical record. 10. Click the Download Summary menu link to download a portable copy of your medical information. If you have questions, please visit the Frequently Asked Questions section of the Sitrion website. Remember, Sitrion is NOT to be used for urgent needs. For medical emergencies, dial 911. Now available from your iPhone and Android! Please provide this summary of care documentation to your next provider. Your primary care clinician is listed as Lauren Jolley. If you have any questions after today's visit, please call [phone].

## 2018-08-15 NOTE — PROGRESS NOTES
Health Maintenance Due   Topic Date Due    DTaP/Tdap/Td series (1 - Tdap) 04/29/1979    COLONOSCOPY  03/15/2017    ZOSTER VACCINE AGE 60>  02/28/2018    Influenza Age 9 to Adult  08/01/2018     Body mass index is 16.62 kg/(m^2). 1. Have you been to the ER, urgent care clinic since your last visit? Hospitalized since your last visit? No    2. Have you seen or consulted any other health care providers outside of the [address] [place] [address] since your last visit? Include any pap smears or colon screening.  No  Reviewed record in preparation for visit and have necessary documentation  Pt did not bring medication to office visit for review  Information was given to pt on Advanced Directives, Living Will  Information was given on Shingles Vaccine  opportunity was given for questions  Goals that were addressed and/or need to be completed during or after this appointment include

## 2018-08-15 NOTE — PROGRESS NOTES
Chief Complaint   Patient presents with    Hypertension    Labs     he is a 59y.o. year old male who presents for follow up of HTN. Patient with hx of right hemiparesis. Patient says he did not take BP medication this morning. He is not taking statin. Patient denies HA, dizziness, SOB, CP, abdominal pain, dysuria, acute myalgias or arthralgias. He has a hx of OA of neck and lower back. Patient is under weight. He continues to smoke. BP Readings from Last 3 Encounters:   08/15/18 (!) 147/98   01/25/18 119/85   07/17/17 125/84     Wt Readings from Last 3 Encounters:   08/15/18 126 lb (57.2 kg)   01/25/18 132 lb 3.2 oz (60 kg)   07/17/17 133 lb (60.3 kg)     Body mass index is 16.62 kg/(m^2). Medications:     Current Outpatient Prescriptions   Medication Sig    hydroCHLOROthiazide (HYDRODIURIL) 12.5 mg tablet TAKE ONE TABLET BY MOUTH EVERY DAY    ferrous sulfate 325 mg (65 mg iron) tablet Take 1 Tab by mouth Daily (before breakfast).  dronabinol (MARINOL) 2.5 mg capsule Take 1 Cap by mouth two (2) times a day. Max Daily Amount: 5 mg. For decreased appetite and low BMI.  aspirin delayed-release 81 mg tablet TAKE ONE TABLET BY MOUTH DAILY     No current facility-administered medications for this visit.         Problem List:     Patient Active Problem List    Diagnosis Date Noted    Renal cyst 03/30/2017    Staghorn kidney stones 03/30/2017    Cervical osteoarthritis 08/27/2015    Acute right hemiparesis ([address] 08/27/2015    Arthritis of hand 06/22/2015    HTN (hypertension) 04/26/2012    Lumbar back pain     Neck pain     Chronic pain        Medical History:     Past Medical History:   Diagnosis Date    Cervical osteoarthritis 8/27/2015    Chronic pain     neck and back    Hypertension     Lumbar back pain     Neck pain     Staghorn kidney stones 3/30/2017    Staghorn kidney stones 3/30/2017    A large staghorn calculus in superior pole of left kidney 2 cm x 1.6 cm       Allergies: No Known Allergies    Surgical History:     Past Surgical History:   Procedure Laterality Date    HX ORTHOPAEDIC      left knee       Social History:     Social History     Social History    Marital status:      Spouse name: N/A    Number of children: N/A    Years of education: N/A     Social History Main Topics    Smoking status: Current Some Day Smoker     Packs/day: 1.00    Smokeless tobacco: Never Used    Alcohol use 0.0 oz/week     0 Standard drinks or equivalent per week      Comment: occassioally    Drug use: No    Sexual activity: Yes     Partners: Female     Other Topics Concern    None     Social History Narrative          Patient Active Problem List   Diagnosis Code    Lumbar back pain M54.5    Neck pain M54.2    Chronic pain G89.29    HTN (hypertension) I10    Arthritis of hand M19.049    Cervical osteoarthritis M47.812    Acute right hemiparesis (HCC) G81.91    Renal cyst N28.1    Staghorn kidney stones N20.0     Past Surgical History:   Procedure Laterality Date    HX ORTHOPAEDIC      left knee     Social History     Social History    Marital status:      Spouse name: N/A    Number of children: N/A    Years of education: N/A     Occupational History    Not on file. Social History Main Topics    Smoking status: Current Some Day Smoker     Packs/day: 1.00    Smokeless tobacco: Never Used    Alcohol use 0.0 oz/week     0 Standard drinks or equivalent per week      Comment: occassioally    Drug use: No    Sexual activity: Yes     Partners: Female     Other Topics Concern    Not on file     Social History Narrative     Family History   Problem Relation Age of Onset    Cancer Mother     Cancer Father     Stroke Father      Current Outpatient Prescriptions   Medication Sig    hydroCHLOROthiazide (HYDRODIURIL) 12.5 mg tablet TAKE ONE TABLET BY MOUTH EVERY DAY    ferrous sulfate 325 mg (65 mg iron) tablet Take 1 Tab by mouth Daily (before breakfast).     dronabinol (MARINOL) 2.5 mg capsule Take 1 Cap by mouth two (2) times a day. Max Daily Amount: 5 mg. For decreased appetite and low BMI.  aspirin delayed-release 81 mg tablet TAKE ONE TABLET BY MOUTH DAILY     No current facility-administered medications for this visit. No Known Allergies    Review of Systems:  Constitutional: feeling well, denies fatigue, malaise  Skin: Negative for rash or lesion  HEENT: Negative for acute hearing or vision changes  Respiratory: Negative for cough, wheezing or SOB  Cardiovascular: Negative for chest pain, dizziness or palpitations  Gastrointestinal: Negative for nausea or abdominal pain  Genital/urinary: Negative for dysuria or voiding dysfunction  Musculoskeletal: Negative for acute myalgia or arthralgia   Neurological: see HPI, Negative for HA, weakness or paresthesia  Psychlogical: Negative for depression or anxiety     Vitals:    08/15/18 0907   BP: (!) 147/98   Pulse: 60   Resp: 20   Temp: 97.9 °F (36.6 °C)   TempSrc: Oral   SpO2: 99%   Weight: 126 lb (57.2 kg)   Height: 6' 1\" (1.854 m)       Physical Examination:  General: Well developed, well nourished, in no acute distress  Skin: Warm and dry, no rash or lesion appreciated  Head: Normocephalic, atraumatic  Eyes: Sclera clear, EOMI  Neck: Normal range of motion  Respiratory: Clear to auscultation bilaterally with symmetrical effort  Cardiovascular: Normal S1, S2, Regular rate and rhythm  Extremities: Full range of motion, Normal gait  Neurological: right hemiparesis  Psychological: Active, alert and oriented. Affect appropriate     Diagnoses and all orders for this visit:    1. Essential hypertension  -     hydroCHLOROthiazide (HYDRODIURIL) 12.5 mg tablet; TAKE ONE TABLET BY MOUTH EVERY DAY  -     LIPID PANEL  -     METABOLIC PANEL, COMPREHENSIVE  -     TSH 3RD GENERATION    2. History of anemia  -     HGB & HCT    3. Tobacco abuse       Strongly advised patient to stop all use of tobacco products.     I have discussed the diagnosis with the patient and the intended plan as seen in the above orders. The patient expresses understanding and agreement with our plan of care. All of the patient's questions were answered to apparent satisfaction. The patient has received an after-visit summary. The patient knows to call our office if there are any questions or concerns regarding diagnosis and treatment plans. I have discussed medication side effects and warnings with the patient as well. Follow-up Disposition:  Return in about 6 months (around 2/15/2019), or if symptoms worsen or fail to improve.

## 2018-08-16 LAB
ALBUMIN SERPL-MCNC: 4.4 G/DL (ref 3.6–4.8)
ALBUMIN/GLOB SERPL: 1.8 {RATIO} (ref 1.2–2.2)
ALP SERPL-CCNC: 58 IU/L (ref 39–117)
ALT SERPL-CCNC: 10 IU/L (ref 0–44)
AST SERPL-CCNC: 19 IU/L (ref 0–40)
BILIRUB SERPL-MCNC: 0.5 MG/DL (ref 0–1.2)
BUN SERPL-MCNC: 15 MG/DL (ref 8–27)
BUN/CREAT SERPL: 12 (ref 10–24)
CALCIUM SERPL-MCNC: 9.6 MG/DL (ref 8.6–10.2)
CHLORIDE SERPL-SCNC: 103 MMOL/L (ref 96–106)
CHOLEST SERPL-MCNC: 147 MG/DL (ref 100–199)
CO2 SERPL-SCNC: 25 MMOL/L (ref 20–29)
CREAT SERPL-MCNC: 1.27 MG/DL (ref 0.76–1.27)
GLOBULIN SER CALC-MCNC: 2.5 G/DL (ref 1.5–4.5)
GLUCOSE SERPL-MCNC: 75 MG/DL (ref 65–99)
HCT VFR BLD AUTO: 37.7 % (ref 37.5–51)
HDLC SERPL-MCNC: 55 MG/DL
HGB BLD-MCNC: 12.6 G/DL (ref 13–17.7)
LDLC SERPL CALC-MCNC: 85 MG/DL (ref 0–99)
POTASSIUM SERPL-SCNC: 4.2 MMOL/L (ref 3.5–5.2)
PROT SERPL-MCNC: 6.9 G/DL (ref 6–8.5)
SODIUM SERPL-SCNC: 141 MMOL/L (ref 134–144)
TRIGL SERPL-MCNC: 33 MG/DL (ref 0–149)
TSH SERPL DL<=0.005 MIU/L-ACNC: 0.88 UIU/ML (ref 0.45–4.5)
VLDLC SERPL CALC-MCNC: 7 MG/DL (ref 5–40)

## 2019-03-01 ENCOUNTER — OFFICE VISIT (OUTPATIENT)
Dept: FAMILY MEDICINE CLINIC | Age: 59
End: 2019-03-01

## 2019-03-01 VITALS
DIASTOLIC BLOOD PRESSURE: 91 MMHG | WEIGHT: 128 LBS | OXYGEN SATURATION: 98 % | TEMPERATURE: 97.8 F | SYSTOLIC BLOOD PRESSURE: 125 MMHG | HEART RATE: 80 BPM | BODY MASS INDEX: 16.96 KG/M2 | HEIGHT: 73 IN | RESPIRATION RATE: 20 BRPM

## 2019-03-01 DIAGNOSIS — Z72.0 TOBACCO ABUSE: ICD-10-CM

## 2019-03-01 DIAGNOSIS — I10 ESSENTIAL HYPERTENSION: Primary | ICD-10-CM

## 2019-03-01 DIAGNOSIS — Z86.2 HISTORY OF ANEMIA: ICD-10-CM

## 2019-03-01 RX ORDER — HYDROCHLOROTHIAZIDE 12.5 MG/1
TABLET ORAL
Qty: 30 TAB | Refills: 5 | Status: SHIPPED | OUTPATIENT
Start: 2019-03-01 | End: 2020-04-15

## 2019-03-01 NOTE — PATIENT INSTRUCTIONS
A Healthy Lifestyle: Care Instructions Your Care Instructions A healthy lifestyle can help you feel good, stay at a healthy weight, and have plenty of energy for both work and play. A healthy lifestyle is something you can share with your whole family. A healthy lifestyle also can lower your risk for serious health problems, such as high blood pressure, heart disease, and diabetes. You can follow a few steps listed below to improve your health and the health of your family. Follow-up care is a key part of your treatment and safety. Be sure to make and go to all appointments, and call your doctor if you are having problems. It's also a good idea to know your test results and keep a list of the medicines you take. How can you care for yourself at home? · Do not eat too much sugar, fat, or fast foods. You can still have dessert and treats now and then. The goal is moderation. · Start small to improve your eating habits. Pay attention to portion sizes, drink less juice and soda pop, and eat more fruits and vegetables. ? Eat a healthy amount of food. A 3-ounce serving of meat, for example, is about the size of a deck of cards. Fill the rest of your plate with vegetables and whole grains. ? Limit the amount of soda and sports drinks you have every day. Drink more water when you are thirsty. ? Eat at least 5 servings of fruits and vegetables every day. It may seem like a lot, but it is not hard to reach this goal. A serving or helping is 1 piece of fruit, 1 cup of vegetables, or 2 cups of leafy, raw vegetables. Have an apple or some carrot sticks as an afternoon snack instead of a candy bar. Try to have fruits and/or vegetables at every meal. 
· Make exercise part of your daily routine. You may want to start with simple activities, such as walking, bicycling, or slow swimming. Try to be active 30 to 60 minutes every day.  You do not need to do all 30 to 60 minutes all at once. For example, you can exercise 3 times a day for 10 or 20 minutes. Moderate exercise is safe for most people, but it is always a good idea to talk to your doctor before starting an exercise program. 
· Keep moving. Brendan Boron the lawn, work in the garden, or [place]. Take the stairs instead of the elevator at work. · If you smoke, quit. People who smoke have an increased risk for heart attack, stroke, cancer, and other lung illnesses. Quitting is hard, but there are ways to boost your chance of quitting tobacco for good. ? Use nicotine gum, patches, or lozenges. ? Ask your doctor about stop-smoking programs and medicines. ? Keep trying. In addition to reducing your risk of diseases in the future, you will notice some benefits soon after you stop using tobacco. If you have shortness of breath or asthma symptoms, they will likely get better within a few weeks after you quit. · Limit how much alcohol you drink. Moderate amounts of alcohol (up to 2 drinks a day for men, 1 drink a day for women) are okay. But drinking too much can lead to liver problems, high blood pressure, and other health problems. Family health If you have a family, there are many things you can do together to improve your health. · Eat meals together as a family as often as possible. · Eat healthy foods. This includes fruits, vegetables, lean meats and dairy, and whole grains. · Include your family in your fitness plan. Most people think of activities such as jogging or tennis as the way to fitness, but there are many ways you and your family can be more active. Anything that makes you breathe hard and gets your heart pumping is exercise. Here are some tips: 
? Walk to do errands or to take your child to school or the bus. 
? Go for a family bike ride after dinner instead of watching TV. Where can you learn more? Go to http://young-candis.info/. Enter F144 in the search box to learn more about \"A Healthy Lifestyle: Care Instructions. \" Current as of: September 11, 2018 Content Version: 11.9 © 9359-7349 [employer]. Care instructions adapted under license by [employer] (which disclaims liability or warranty for this information). If you have questions about a medical condition or this instruction, always ask your healthcare professional. [place] [address] any warranty or liability for your use of this information.

## 2019-03-01 NOTE — PROGRESS NOTES
1. Have you been to the ER, urgent care clinic since your last visit? Hospitalized since your last visit? No 
 
2. Have you seen or consulted any other health care providers outside of the [address] [place] [address] since your last visit? Include any pap smears or colon screening. No 
Reviewed record in preparation for visit and have necessary documentation Pt did not bring medication to office visit for review Goals that were addressed and/or need to be completed during or after this appointment include Health Maintenance Due Topic Date Due  
 DTaP/Tdap/Td series (1 - Tdap) 04/29/1979  Shingrix Vaccine Age 50> (1 of 2) 04/29/2008  COLONOSCOPY  03/15/2017  Influenza Age 5 to Adult  08/01/2018

## 2019-03-02 LAB
ALBUMIN SERPL-MCNC: 5 G/DL (ref 3.6–4.8)
ALBUMIN/GLOB SERPL: 1.7 {RATIO} (ref 1.2–2.2)
ALP SERPL-CCNC: 58 IU/L (ref 39–117)
ALT SERPL-CCNC: 14 IU/L (ref 0–44)
AST SERPL-CCNC: 19 IU/L (ref 0–40)
BILIRUB SERPL-MCNC: 0.3 MG/DL (ref 0–1.2)
BUN SERPL-MCNC: 26 MG/DL (ref 8–27)
BUN/CREAT SERPL: 20 (ref 10–24)
CALCIUM SERPL-MCNC: 10.4 MG/DL (ref 8.6–10.2)
CHLORIDE SERPL-SCNC: 98 MMOL/L (ref 96–106)
CHOLEST SERPL-MCNC: 188 MG/DL (ref 100–199)
CO2 SERPL-SCNC: 25 MMOL/L (ref 20–29)
CREAT SERPL-MCNC: 1.28 MG/DL (ref 0.76–1.27)
ERYTHROCYTE [DISTWIDTH] IN BLOOD BY AUTOMATED COUNT: 13.8 % (ref 12.3–15.4)
GLOBULIN SER CALC-MCNC: 3 G/DL (ref 1.5–4.5)
GLUCOSE SERPL-MCNC: 67 MG/DL (ref 65–99)
HCT VFR BLD AUTO: 44.3 % (ref 37.5–51)
HDLC SERPL-MCNC: 62 MG/DL
HGB BLD-MCNC: 14.7 G/DL (ref 13–17.7)
LDLC SERPL CALC-MCNC: 118 MG/DL (ref 0–99)
MCH RBC QN AUTO: 32 PG (ref 26.6–33)
MCHC RBC AUTO-ENTMCNC: 33.2 G/DL (ref 31.5–35.7)
MCV RBC AUTO: 97 FL (ref 79–97)
PLATELET # BLD AUTO: 251 X10E3/UL (ref 150–379)
POTASSIUM SERPL-SCNC: 4.4 MMOL/L (ref 3.5–5.2)
PROT SERPL-MCNC: 8 G/DL (ref 6–8.5)
RBC # BLD AUTO: 4.59 X10E6/UL (ref 4.14–5.8)
SODIUM SERPL-SCNC: 137 MMOL/L (ref 134–144)
TRIGL SERPL-MCNC: 42 MG/DL (ref 0–149)
TSH SERPL DL<=0.005 MIU/L-ACNC: 1.83 UIU/ML (ref 0.45–4.5)
VLDLC SERPL CALC-MCNC: 8 MG/DL (ref 5–40)
WBC # BLD AUTO: 5 X10E3/UL (ref 3.4–10.8)

## 2019-03-11 NOTE — PROGRESS NOTES
Chief Complaint   Patient presents with    Follow-up     he is a 59y.o. year old male who presents for follow up of chronic health conditions. Patient with hx of HTN, right hemiparesis and tobacco abuse. Patient denies HA, dizziness, SOB, CP, abdominal pain, dysuria, acute myalgias or arthralgias. He has a hx of OA of neck and lower back. Patient is under weight. He continues to smoke. BP Readings from Last 3 Encounters:   03/01/19 (!) 125/91   08/15/18 (!) 147/98   01/25/18 119/85     Wt Readings from Last 3 Encounters:   03/01/19 128 lb (58.1 kg)   08/15/18 126 lb (57.2 kg)   01/25/18 132 lb 3.2 oz (60 kg)     Body mass index is 16.89 kg/m². Medications:     Current Outpatient Medications   Medication Sig    hydroCHLOROthiazide (HYDRODIURIL) 12.5 mg tablet TAKE ONE TABLET BY MOUTH EVERY DAY    aspirin delayed-release 81 mg tablet TAKE ONE TABLET BY MOUTH EVERY DAY    ferrous sulfate 325 mg (65 mg iron) tablet Take 1 Tab by mouth Daily (before breakfast).  dronabinol (MARINOL) 2.5 mg capsule Take 1 Cap by mouth two (2) times a day. Max Daily Amount: 5 mg. For decreased appetite and low BMI. No current facility-administered medications for this visit.         Problem List:     Patient Active Problem List    Diagnosis Date Noted    Renal cyst 03/30/2017    Staghorn kidney stones 03/30/2017    Cervical osteoarthritis 08/27/2015    Acute right hemiparesis ([address] 08/27/2015    Arthritis of hand 06/22/2015    HTN (hypertension) 04/26/2012    Lumbar back pain     Neck pain     Chronic pain        Medical History:     Past Medical History:   Diagnosis Date    Cervical osteoarthritis 8/27/2015    Chronic pain     neck and back    Hypertension     Lumbar back pain     Neck pain     Staghorn kidney stones 3/30/2017    Staghorn kidney stones 3/30/2017    A large staghorn calculus in superior pole of left kidney 2 cm x 1.6 cm       Allergies:   No Known Allergies    Surgical History: Past Surgical History:   Procedure Laterality Date    HX ORTHOPAEDIC      left knee       Social History:     Social History     Socioeconomic History    Marital status:      Spouse name: Not on file    Number of children: Not on file    Years of education: Not on file    Highest education level: Not on file   Tobacco Use    Smoking status: Current Some Day Smoker     Packs/day: 1.00    Smokeless tobacco: Never Used   Substance and Sexual Activity    Alcohol use: Yes     Alcohol/week: 0.0 oz     Comment: occassioally    Drug use: No    Sexual activity: Yes     Partners: Female          Patient Active Problem List   Diagnosis Code    Lumbar back pain M54.5    Neck pain M54.2    Chronic pain G89.29    HTN (hypertension) I10    Arthritis of hand M19.049    Cervical osteoarthritis M47.812    Acute right hemiparesis (HCC) G81.91    Renal cyst N28.1    Staghorn kidney stones N20.0     Past Surgical History:   Procedure Laterality Date    HX ORTHOPAEDIC      left knee     Social History     Socioeconomic History    Marital status:      Spouse name: Not on file    Number of children: Not on file    Years of education: Not on file    Highest education level: Not on file   Social Needs    Financial resource strain: Not on file    Food insecurity - worry: Not on file    Food insecurity - inability: Not on file   [employer] needs - medical: Not on file   [employer] needs - non-medical: Not on file   Occupational History    Not on file   Tobacco Use    Smoking status: Current Some Day Smoker     Packs/day: 1.00    Smokeless tobacco: Never Used   Substance and Sexual Activity    Alcohol use:  Yes     Alcohol/week: 0.0 oz     Comment: occassioally    Drug use: No    Sexual activity: Yes     Partners: Female   Other Topics Concern    Not on file   Social History Narrative    Not on file     Family History   Problem Relation Age of Onset    Cancer Mother     Cancer Father     Stroke Father      Current Outpatient Medications   Medication Sig    hydroCHLOROthiazide (HYDRODIURIL) 12.5 mg tablet TAKE ONE TABLET BY MOUTH EVERY DAY    aspirin delayed-release 81 mg tablet TAKE ONE TABLET BY MOUTH EVERY DAY    ferrous sulfate 325 mg (65 mg iron) tablet Take 1 Tab by mouth Daily (before breakfast).  dronabinol (MARINOL) 2.5 mg capsule Take 1 Cap by mouth two (2) times a day. Max Daily Amount: 5 mg. For decreased appetite and low BMI. No current facility-administered medications for this visit. No Known Allergies    Review of Systems:  Constitutional: feeling well, denies fatigue, malaise  Skin: Negative for rash or lesion  HEENT: Negative for acute hearing or vision changes  Respiratory: Negative for cough, wheezing or SOB  Cardiovascular: Negative for chest pain, dizziness or palpitations  Gastrointestinal: Negative for nausea or abdominal pain  Genital/urinary: Negative for dysuria or voiding dysfunction  Musculoskeletal: Negative for acute myalgia or arthralgia   Neurological: see HPI, Negative for HA, weakness or paresthesia  Psychlogical: Negative for depression or anxiety     Vitals:    03/01/19 0838   BP: (!) 125/91   Pulse: 80   Resp: 20   Temp: 97.8 °F (36.6 °C)   TempSrc: Oral   SpO2: 98%   Weight: 128 lb (58.1 kg)   Height: 6' 1\" (1.854 m)       Physical Examination:  General: Well developed, well nourished, in no acute distress  Skin: Warm and dry, normal tone and turgor  Head: Normocephalic, atraumatic  Eyes: Sclera clear, EOMI  Neck: Normal range of motion  Respiratory: Clear to auscultation bilaterally with symmetrical effort  Cardiovascular: Normal S1, S2, Regular rate and rhythm  Extremities: Full range of motion  Neurological: right hemiparesis  Psychological: Active, alert and oriented. Affect appropriate     Diagnoses and all orders for this visit:    1.  Essential hypertension  -     hydroCHLOROthiazide (HYDRODIURIL) 12.5 mg tablet; TAKE ONE TABLET BY MOUTH EVERY DAY  -     LIPID PANEL  -     TSH 3RD GENERATION  -     METABOLIC PANEL, COMPREHENSIVE    2. History of anemia  -     CBC W/O DIFF    3. Tobacco abuse       Strongly advised patient to stop all use of tobacco products. I have discussed the diagnosis with the patient and the intended plan as seen in the above orders. The patient expresses understanding and agreement with our plan of care. All of the patient's questions were answered to apparent satisfaction. The patient has received an after-visit summary. The patient knows to call our office if there are any questions or concerns regarding diagnosis and treatment plans. I have discussed medication side effects and warnings with the patient as well. Follow-up Disposition:  Return in about 6 months (around 9/1/2019), or if symptoms worsen or fail to improve.

## 2019-06-18 ENCOUNTER — OFFICE VISIT (OUTPATIENT)
Dept: FAMILY MEDICINE CLINIC | Age: 59
End: 2019-06-18

## 2019-06-18 VITALS
BODY MASS INDEX: 16.49 KG/M2 | OXYGEN SATURATION: 100 % | DIASTOLIC BLOOD PRESSURE: 76 MMHG | WEIGHT: 124.4 LBS | HEIGHT: 73 IN | TEMPERATURE: 100.1 F | SYSTOLIC BLOOD PRESSURE: 115 MMHG | RESPIRATION RATE: 18 BRPM | HEART RATE: 71 BPM

## 2019-06-18 DIAGNOSIS — C16.9 MALIGNANT NEOPLASM OF STOMACH, UNSPECIFIED LOCATION (HCC): Primary | ICD-10-CM

## 2019-06-18 DIAGNOSIS — R63.6 UNDERWEIGHT: ICD-10-CM

## 2019-06-18 DIAGNOSIS — K21.9 GASTROESOPHAGEAL REFLUX DISEASE, ESOPHAGITIS PRESENCE NOT SPECIFIED: ICD-10-CM

## 2019-06-18 DIAGNOSIS — I10 ESSENTIAL HYPERTENSION: ICD-10-CM

## 2019-06-18 DIAGNOSIS — Z72.0 TOBACCO ABUSE: ICD-10-CM

## 2019-06-18 RX ORDER — PANTOPRAZOLE SODIUM 40 MG/1
TABLET, DELAYED RELEASE ORAL
COMMUNITY
Start: 2019-06-18

## 2019-06-18 RX ORDER — SUCRALFATE 1 G/1
1 TABLET ORAL 4 TIMES DAILY
Qty: 120 TAB | Refills: 2 | OUTPATIENT
Start: 2019-06-18 | End: 2020-04-15

## 2019-06-18 RX ORDER — OXYCODONE AND ACETAMINOPHEN 5; 325 MG/1; MG/1
TABLET ORAL
COMMUNITY
Start: 2019-06-18 | End: 2022-01-01

## 2019-06-18 RX ORDER — SUCRALFATE 1 G/1
1 TABLET ORAL 4 TIMES DAILY
COMMUNITY
End: 2020-04-15 | Stop reason: SDUPTHER

## 2019-06-18 RX ORDER — IBUPROFEN 200 MG
TABLET ORAL
COMMUNITY
Start: 2019-06-18 | End: 2020-04-15

## 2019-06-18 NOTE — LETTER
6/18/2019 3:27 PM 
 
Mr[address] [address] Dear Mr. King: An appointment has been made for you to see Dr. Berna Martino, oncologist, on June 27, 2019 at 2:00. The office is located at [address] [place] [address], [address] [place] [address] and the number to the office is [phone]. If you have any questions, please call our office. Sincerely, Baldomero Sue

## 2019-06-18 NOTE — PROGRESS NOTES
Chief Complaint   Patient presents with   [facility] Follow Up     Visit Vitals  /76 (BP 1 Location: Right arm, BP Patient Position: Sitting)   Pulse 71   Temp 100.1 °F (37.8 °C) (Oral)   Resp 18   Ht 6' 1\" (1.854 m)   Wt 124 lb 6.4 oz (56.4 kg)   SpO2 100%   BMI 16.41 kg/m²     1. Have you been to the ER, urgent care clinic since your last visit? Hospitalized since your last visit? Yes [facility]    2. Have you seen or consulted any other health care providers outside of the [address] [place] [address] since your last visit? Include any pap smears or colon screening.  No    Reviewed record in preparation for visit and have necessary documentation  Pt did not bring medication to office visit for review  opportunity was given for questions  Goals that were addressed and/or need to be completed during or after this appointment include   Health Maintenance Due   Topic Date Due    DTaP/Tdap/Td series (1 - Tdap) 04/29/1979    Shingrix Vaccine Age 50> (1 of 2) 04/29/2008    COLONOSCOPY  03/15/2017

## 2019-06-19 NOTE — PROGRESS NOTES
Chief Complaint   Patient presents with   [facility] Follow Up     he is a 62y.o. year old male who presents for transition of care. Patient with hospitalization at [facility] in [place]. He was admitted on 6/10/19 with discharge on 6/18/19  for upper GI bleed, anemia secondary to blood loss, KIMBERLY and electrolyte abnormalities. UDS + for THC and cocaine. EGD identified gastric cancer. He has been referred to oncology surgeon at [address] [place] [address]. No referral to heme-oncology. Patient with hx of HTN, right hemiparesis and tobacco abuse. Patient denies HA, dizziness, SOB, CP, abdominal pain, dysuria, acute myalgias or arthralgias. He has a hx of OA of neck and lower back. Patient is under weight. He continues to smoke. BP Readings from Last 3 Encounters:   06/18/19 115/76   03/01/19 (!) 125/91   08/15/18 (!) 147/98     Wt Readings from Last 3 Encounters:   06/18/19 124 lb 6.4 oz (56.4 kg)   03/01/19 128 lb (58.1 kg)   08/15/18 126 lb (57.2 kg)     Body mass index is 16.41 kg/m². Medications:     Current Outpatient Medications   Medication Sig    nicotine (NICODERM CQ) 14 mg/24 hr patch     oxyCODONE-acetaminophen (PERCOCET) 5-325 mg per tablet     pantoprazole (PROTONIX) 40 mg tablet     sucralfate (CARAFATE) 1 gram tablet Take 1 g by mouth four (4) times daily.  sucralfate (CARAFATE) 1 gram tablet Take 1 Tab by mouth four (4) times daily.  aspirin delayed-release 81 mg tablet TAKE ONE TABLET BY MOUTH EVERY DAY    hydroCHLOROthiazide (HYDRODIURIL) 12.5 mg tablet TAKE ONE TABLET BY MOUTH EVERY DAY    ferrous sulfate 325 mg (65 mg iron) tablet Take 1 Tab by mouth Daily (before breakfast).  dronabinol (MARINOL) 2.5 mg capsule Take 1 Cap by mouth two (2) times a day. Max Daily Amount: 5 mg. For decreased appetite and low BMI. No current facility-administered medications for this visit.         Problem List:     Patient Active Problem List    Diagnosis Date Noted    Renal cyst 03/30/2017    Staghorn kidney stones 03/30/2017    Cervical osteoarthritis 08/27/2015    Acute right hemiparesis ([address] 08/27/2015    Arthritis of hand 06/22/2015    HTN (hypertension) 04/26/2012    Lumbar back pain     Neck pain     Chronic pain        Medical History:     Past Medical History:   Diagnosis Date    Cervical osteoarthritis 8/27/2015    Chronic pain     neck and back    Hypertension     Lumbar back pain     Neck pain     Staghorn kidney stones 3/30/2017    Staghorn kidney stones 3/30/2017    A large staghorn calculus in superior pole of left kidney 2 cm x 1.6 cm       Allergies:   No Known Allergies    Surgical History:     Past Surgical History:   Procedure Laterality Date    HX ORTHOPAEDIC      left knee       Social History:     Social History     Socioeconomic History    Marital status:      Spouse name: Not on file    Number of children: Not on file    Years of education: Not on file    Highest education level: Not on file   Tobacco Use    Smoking status: Current Some Day Smoker     Packs/day: 1.00    Smokeless tobacco: Never Used   Substance and Sexual Activity    Alcohol use:  Yes     Alcohol/week: 0.0 oz     Comment: occassioally    Drug use: No    Sexual activity: Yes     Partners: Female          Patient Active Problem List   Diagnosis Code    Lumbar back pain M54.5    Neck pain M54.2    Chronic pain G89.29    HTN (hypertension) I10    Arthritis of hand M19.049    Cervical osteoarthritis M47.812    Acute right hemiparesis (HCC) G81.91    Renal cyst N28.1    Staghorn kidney stones N20.0     Past Surgical History:   Procedure Laterality Date    HX ORTHOPAEDIC      left knee     Social History     Socioeconomic History    Marital status:      Spouse name: Not on file    Number of children: Not on file    Years of education: Not on file    Highest education level: Not on file   Occupational History    Not on file   Social Needs    Financial resource strain: Not on file    Food insecurity:     Worry: Not on file     Inability: Not on file    Transportation needs:     Medical: Not on file     Non-medical: Not on file   Tobacco Use    Smoking status: Current Some Day Smoker     Packs/day: 1.00    Smokeless tobacco: Never Used   Substance and Sexual Activity    Alcohol use: Yes     Alcohol/week: 0.0 oz     Comment: occassioally    Drug use: No    Sexual activity: Yes     Partners: Female   Lifestyle    Physical activity:     Days per week: Not on file     Minutes per session: Not on file    Stress: Not on file   Relationships    Social connections:     Talks on phone: Not on file     Gets together: Not on file     Attends [employer] service: Not on file     Active member of club or organization: Not on file     Attends meetings of clubs or organizations: Not on file     Relationship status: Not on file    Intimate partner violence:     Fear of current or ex partner: Not on file     Emotionally abused: Not on file     Physically abused: Not on file     Forced sexual activity: Not on file   Other Topics Concern    Not on file   Social History Narrative    Not on file     Family History   Problem Relation Age of Onset    Cancer Mother     Cancer Father     Stroke Father      Current Outpatient Medications   Medication Sig    nicotine (NICODERM CQ) 14 mg/24 hr patch     oxyCODONE-acetaminophen (PERCOCET) 5-325 mg per tablet     pantoprazole (PROTONIX) 40 mg tablet     sucralfate (CARAFATE) 1 gram tablet Take 1 g by mouth four (4) times daily.  sucralfate (CARAFATE) 1 gram tablet Take 1 Tab by mouth four (4) times daily.  aspirin delayed-release 81 mg tablet TAKE ONE TABLET BY MOUTH EVERY DAY    hydroCHLOROthiazide (HYDRODIURIL) 12.5 mg tablet TAKE ONE TABLET BY MOUTH EVERY DAY    ferrous sulfate 325 mg (65 mg iron) tablet Take 1 Tab by mouth Daily (before breakfast).  dronabinol (MARINOL) 2.5 mg capsule Take 1 Cap by mouth two (2) times a day.  Max Daily Amount: 5 mg. For decreased appetite and low BMI. No current facility-administered medications for this visit. No Known Allergies    Review of Systems:  Constitutional: feeling well, denies fatigue, malaise  Skin: Negative for rash or lesion  HEENT: Negative for acute hearing or vision changes  Respiratory: Negative for cough, wheezing or SOB  Cardiovascular: Negative for chest pain, dizziness or palpitations  Gastrointestinal: see HPI, Negative for nausea or abdominal pain  Genital/urinary: Negative for dysuria or voiding dysfunction  Musculoskeletal: Negative for acute myalgia or arthralgia   Neurological: see HPI, Negative for HA, acute weakness or paresthesia  Psychlogical: Negative for depression or anxiety     Vitals:    06/18/19 1431   BP: 115/76   Pulse: 71   Resp: 18   Temp: 100.1 °F (37.8 °C)   TempSrc: Oral   SpO2: 100%   Weight: 124 lb 6.4 oz (56.4 kg)   Height: 6' 1\" (1.854 m)       Physical Examination:  General: Thin, in no acute distress  Skin: Warm and dry, normal tone and turgor  Head: Normocephalic, atraumatic  Eyes: Sclera clear, EOMI  Neck: Normal range of motion  Respiratory: Clear to auscultation bilaterally with symmetrical effort  Cardiovascular: Normal S1, S2, Regular rate and rhythm  Extremities: Full range of motion, antalgic gait  Neurological: right hemiparesis  Psychological: Active, alert and oriented. Affect appropriate     Diagnoses and all orders for this visit:    1. Malignant neoplasm of stomach, unspecified location ([address]  -     REFERRAL TO ONCOLOGY    2. Underweight    3. Gastroesophageal reflux disease, esophagitis presence not specified    4. Essential hypertension    5. Tobacco abuse    Other orders  -     sucralfate (CARAFATE) 1 gram tablet; Take 1 Tab by mouth four (4) times daily. Available [facility] hospital records reviewed. Strongly advised patient to stop all use of tobacco products and any street drugs.   I have discussed the diagnosis with the patient and the intended plan as seen in the above orders. The patient expresses understanding and agreement with our plan of care. All of the patient's questions were answered to apparent satisfaction. The patient has received an after-visit summary. The patient knows to call our office if there are any questions or concerns regarding diagnosis and treatment plans. I have discussed medication side effects and warnings with the patient as well. Follow-up and Dispositions    · Return in about 3 weeks (around 7/9/2019).

## 2019-06-20 NOTE — PATIENT INSTRUCTIONS
Stomach Cancer: Care Instructions  Your Care Instructions    Stomach cancer occurs when abnormal cells grow out of control in one of the three layers of the stomach. Several types of cancer can occur in the stomach. Cancer usually starts in the inner layer (where food touches the stomach) and moves into the outer layers. It can spread to nearby organs or to distant areas of the body. Treatment depends on how far the cancer has spread and on your overall health. Surgery to take out part or all of the stomach is the most common treatment. You also may take medicines (chemotherapy) or get radiation treatments to kill the cancer cells. Treatment with chemotherapy or radiation can make you very tired and nauseated. You may vomit or have diarrhea. It also can make your immune system weaker. This can raise your risk of infection. When you find out that you have cancer, you may feel many emotions and may need some help coping. Seek out family, friends, and counselors for support. You also can do things at home to make yourself feel better while you go through treatment. Call the [employer] (6-352.310.7555) or visit its website at [facility] [facility] for more information. Follow-up care is a key part of your treatment and safety. Be sure to make and go to all appointments, and call your doctor if you are having problems. It's also a good idea to know your test results and keep a list of the medicines you take. How can you care for yourself at home? · Take your medicines exactly as prescribed. Call your doctor if you think you are having a problem with your medicine. You may get medicine for nausea and vomiting if you have these side effects. · Follow your doctor's directions for eating after you have had surgery. You will need to eat slowly and to have several small meals a day, because you will feel full sooner than you did before.  Diet guidelines will help prevent a problem called dumping syndrome, which happens when food goes into the small intestine too quickly. Dumping syndrome can make you feel faint, bloated, shaky, and nauseated, and have diarrhea. · Get some physical activity every day, but do not get too tired. Keep doing the hobbies you enjoy as your energy allows. · Take steps to control your stress and workload. Learn relaxation techniques. ? Share your feelings. Stress and tension affect our emotions. By expressing your feelings to others, you may be able to understand and cope with them. ? Consider joining a support group. Talking about a problem with your spouse, a good friend, or other people with similar problems is a good way to reduce tension and stress. ? Express yourself through art. Try writing, crafts, dance, or art to relieve stress. Some dance, writing, or art groups may be available just for people who have cancer. ? Be kind to your body and mind. Getting enough sleep, eating a healthy diet, and taking time to do things you enjoy can contribute to an overall feeling of balance in your life and can help reduce stress. ? Get help if you need it. Discuss your concerns with your doctor or counselor. · If you are vomiting or have diarrhea:  ? Drink plenty of fluids (enough so that your urine is light yellow or clear like water) to prevent dehydration. Choose water and other caffeine-free clear liquids. If you have kidney, heart, or liver disease and have to limit fluids, talk with your doctor before you increase the amount of fluids you drink. ? When you are able to eat, try clear soups, mild foods, and liquids until all symptoms are gone for 12 to 48 hours. Other good choices include dry toast, crackers, cooked cereal, and gelatin dessert, such as Jell-O.  · If you have not already done so, prepare a list of advance directives.  Advance directives are instructions to your doctor and family members about what kind of care you want if you become unable to speak or express yourself. When should you call for help? Call 911 anytime you think you may need emergency care. For example, call if:    · You vomit blood or what looks like coffee grounds.     · Your stools are maroon or very bloody.    Call your doctor now or seek immediate medical care if:    · Your stools are black and look like tar, or they have streaks of blood.     · You have new or worse belly pain.     · You are vomiting.    Watch closely for changes in your health, and be sure to contact your doctor if:    · You do not get better as expected. Where can you learn more? Go to http://young-candis.info/. Enter V273 in the search box to learn more about \"Stomach Cancer: Care Instructions. \"  Current as of: March 27, 2018  Content Version: 11.9  © 8387-5320 "[employer] Care instructions adapted under license by [employer] (which disclaims liability or warranty for this information). If you have questions about a medical condition or this instruction, always ask your healthcare professional. [place] [address] any warranty or liability for your use of this information. Stopping Smoking: Care Instructions  Your Care Instructions  Cigarette smokers crave the nicotine in cigarettes. Giving it up is much harder than simply changing a habit. Your body has to stop craving the nicotine. It is hard to quit, but you can do it. There are many tools that people use to quit smoking. You may find that combining tools works best for you. There are several steps to quitting. First you get ready to quit. Then you get support to help you. After that, you learn new skills and behaviors to become a nonsmoker. For many people, a necessary step is getting and using medicine. Your doctor will help you set up the plan that best meets your needs. You may want to attend a smoking cessation program to help you quit smoking.  When you choose a program, look for one that has proven success. Ask your doctor for ideas. You will greatly increase your chances of success if you take medicine as well as get counseling or join a cessation program.  Some of the changes you feel when you first quit tobacco are uncomfortable. Your body will miss the nicotine at first, and you may feel short-tempered and grumpy. You may have trouble sleeping or concentrating. Medicine can help you deal with these symptoms. You may struggle with changing your smoking habits and rituals. The last step is the tricky one: Be prepared for the smoking urge to continue for a time. This is a lot to deal with, but keep at it. You will feel better. Follow-up care is a key part of your treatment and safety. Be sure to make and go to all appointments, and call your doctor if you are having problems. It's also a good idea to know your test results and keep a list of the medicines you take. How can you care for yourself at home? · Ask your family, friends, and coworkers for support. You have a better chance of quitting if you have help and support. · Join a support group, such as Nicotine Anonymous, for people who are trying to quit smoking. · Consider signing up for a smoking cessation program, such as the American Lung Association's Freedom from Smoking program.  · Get text messaging support. Go to the website at www.smokefree. gov to sign up for the [facility] program.  · Set a quit date. Pick your date carefully so that it is not right in the middle of a big deadline or stressful time. Once you quit, do not even take a puff. Get rid of all ashtrays and lighters after your last cigarette. Clean your house and your clothes so that they do not smell of smoke. · Learn how to be a nonsmoker. Think about ways you can avoid those things that make you reach for a cigarette. ? Avoid situations that put you at greatest risk for smoking. For some people, it is hard to have a drink with friends without smoking.  For others, they might skip a coffee break with coworkers who smoke. ? Change your daily routine. Take a different route to work or eat a meal in a different place. · Cut down on stress. Calm yourself or release tension by doing an activity you enjoy, such as reading a book, taking a hot bath, or gardening. · Talk to your doctor or pharmacist about nicotine replacement therapy, which replaces the nicotine in your body. You still get nicotine but you do not use tobacco. Nicotine replacement products help you slowly reduce the amount of nicotine you need. These products come in several forms, many of them available over-the-counter:  ? Nicotine patches  ? Nicotine gum and lozenges  ? Nicotine inhaler  · Ask your doctor about bupropion (Wellbutrin) or varenicline (Chantix), which are prescription medicines. They do not contain nicotine. They help you by reducing withdrawal symptoms, such as stress and anxiety. · Some people find hypnosis, acupuncture, and massage helpful for ending the smoking habit. · Eat a healthy diet and get regular exercise. Having healthy habits will help your body move past its craving for nicotine. · Be prepared to keep trying. Most people are not successful the first few times they try to quit. Do not get mad at yourself if you smoke again. Make a list of things you learned and think about when you want to try again, such as next week, next month, or next year. Where can you learn more? Go to http://young-candis.info/. Enter I025 in the search box to learn more about \"Stopping Smoking: Care Instructions. \"  Current as of: September 26, 2018  Content Version: 11.9  © 6641-8345 [employer]. Care instructions adapted under license by [employer] (which disclaims liability or warranty for this information).  If you have questions about a medical condition or this instruction, always ask your healthcare professional. Yulissa Babb disclaims any warranty or liability for your use of this information.

## 2019-07-12 ENCOUNTER — PATIENT OUTREACH (OUTPATIENT)
Dept: FAMILY MEDICINE CLINIC | Age: 59
End: 2019-07-12

## 2019-11-01 ENCOUNTER — PATIENT OUTREACH (OUTPATIENT)
Dept: FAMILY MEDICINE CLINIC | Age: 59
End: 2019-11-01

## 2019-11-04 NOTE — PROGRESS NOTES
[facility] has been unable to reach patient to offer CM services after two attempts. Was able to leave voicemail requesting return call on second attempt. Will try again later.

## 2019-11-11 ENCOUNTER — PATIENT OUTREACH (OUTPATIENT)
Dept: FAMILY MEDICINE CLINIC | Age: 59
End: 2019-11-11

## 2020-03-31 ENCOUNTER — TELEPHONE (OUTPATIENT)
Dept: FAMILY MEDICINE CLINIC | Age: 60
End: 2020-03-31

## 2020-04-03 ENCOUNTER — VIRTUAL VISIT (OUTPATIENT)
Dept: FAMILY MEDICINE CLINIC | Age: 60
End: 2020-04-03

## 2020-04-15 ENCOUNTER — VIRTUAL VISIT (OUTPATIENT)
Dept: FAMILY MEDICINE CLINIC | Age: 60
End: 2020-04-15

## 2020-04-15 DIAGNOSIS — C16.9 MALIGNANT NEOPLASM OF STOMACH, UNSPECIFIED LOCATION (HCC): ICD-10-CM

## 2020-04-15 DIAGNOSIS — Z72.0 TOBACCO ABUSE: ICD-10-CM

## 2020-04-15 DIAGNOSIS — J18.9 PNEUMONIA OF RIGHT LOWER LOBE DUE TO INFECTIOUS ORGANISM: Primary | ICD-10-CM

## 2020-04-15 RX ORDER — PROCHLORPERAZINE MALEATE 10 MG
10 TABLET ORAL
Qty: 90 TAB | Refills: 0 | COMMUNITY
Start: 2020-04-15 | End: 2022-01-01

## 2020-04-15 RX ORDER — DOCUSATE SODIUM 100 MG/1
100 CAPSULE, LIQUID FILLED ORAL 2 TIMES DAILY
Qty: 60 CAP | Refills: 0 | COMMUNITY
Start: 2020-04-15

## 2020-04-15 NOTE — PROGRESS NOTES
Hussain Escobar is a 62 y.o. male evaluated via telephone on 04/15/20. Patient Identity confirmed by . Telephone encounter done in lieu of office visit due to extraordinary circumstances. A state of national and state emergency has been declared by President and [place] due to the [employer] pandemic. David Hebert LPN coordinated virtual visit    Consent:  Patient and/or health care decision maker is aware that that he may receive a bill for this telephone encounter, depending on his insurance coverage, and has provided verbal consent to proceed: Yes    Physician Location: Office  Patient Location: Home    CC: hospital follow up  Information gathered from patient and/or health care decision maker. HPI: Leisa Valentino Sr. is a 62 y.o. male who was recently hospitalized at [facility] for CAP and SIRS. Patient admitted 3/18/20 with discharge on 3/27/20. Patient Flu A, Flu B, RSV and respiratory panel negative. No SARS-CoV-2 testing done. Chest Xray and CTA showed RLL infiltrate. He was treated in hospital with IV antibiotics and discharged on Augmentin and Azithromycin. Patient has completed course of antibiotics. Patient with hx of gastric cancer, HTN, right hemiparesis and tobacco abuse. Patient denies HA, dizziness, SOB, CP, dysuria, acute myalgias or arthralgias. He admits to resuming tobacco use post discharge. Patient says he is feeling good sans new complaints or concerns at this time.         Current Outpatient Medications:     prochlorperazine (COMPAZINE) 10 mg tablet, Take 1 Tab by mouth every eight (8) hours as needed for Nausea or Vomiting., Disp: 90 Tab, Rfl: 0    docusate sodium (COLACE) 100 mg capsule, Take 1 Cap by mouth two (2) times a day., Disp: 60 Cap, Rfl: 0    oxyCODONE-acetaminophen (PERCOCET) 5-325 mg per tablet, , Disp: , Rfl:     pantoprazole (PROTONIX) 40 mg tablet, , Disp: , Rfl:     ferrous sulfate 325 mg (65 mg iron) tablet, Take 1 Tab by mouth Daily (before breakfast). , Disp: 30 Tab, Rfl: 5     No Known Allergies     Patient Active Problem List    Diagnosis Date Noted    Renal cyst 03/30/2017    Staghorn kidney stones 03/30/2017    Cervical osteoarthritis 08/27/2015    Acute right hemiparesis ([facility] 08/27/2015    Arthritis of hand 06/22/2015    HTN (hypertension) 04/26/2012    Lumbar back pain     Neck pain     Chronic pain         Review of Systems:  Constitutional: Negative for fever, malaise  Resp: see HPI, Negative for cough, wheezing or SOB  CV: Negative for chest pain, dizziness or palpitations  GI: see HPI  MS: Negative for acute myalgias or arthralgias   Neuro: Negative for HA, weakness or paresthesia  Psych: Negative for depression or anxiety       Assessment/Plan:  Details of this discussion including any medical advice provided: Patient advised as a precaution to self isolate at home, practice regular hand washing with soap and warm water and to utilize social distancing in interactions with people outside the home. ICD-10-CM ICD-9-CM    1. Pneumonia of right lower lobe due to infectious organism ([facility] J18.1 486    2. Malignant neoplasm of stomach, unspecified location ([facility] C16.9 151.9    3. Tobacco abuse Z72.0 305.1        Symptomatic therapy suggested: call prn if symptoms persist or worsen. Total Time: minutes: 11-20 minutes was spent on phone discussing above problems and plan. Patient medical history, prior OV notes, vitals flow sheet, lab results, medications, and allergies were reviewed during this encounter. Will schedule 3 month follow up appointment. Patient advised to bring all medication to next OV. For phone encounters:  I affirm this is a patient initiated episode with an established Patient who has not had a related appointment within my department in the past 7 days or scheduled within the next 24 hours.     Note: not billable if this call serves to triage the patient into an appointment for the relevant concern    MD DIANE Mckoy & DAVIN MATHEWS [facility]  04/15/20

## 2020-05-26 ENCOUNTER — VIRTUAL VISIT (OUTPATIENT)
Dept: FAMILY MEDICINE CLINIC | Age: 60
End: 2020-05-26

## 2020-05-26 DIAGNOSIS — I10 ESSENTIAL HYPERTENSION: ICD-10-CM

## 2020-05-26 DIAGNOSIS — C16.9 MALIGNANT NEOPLASM OF STOMACH, UNSPECIFIED LOCATION (HCC): ICD-10-CM

## 2020-05-26 DIAGNOSIS — H00.014 HORDEOLUM EXTERNUM OF LEFT UPPER EYELID: Primary | ICD-10-CM

## 2020-05-26 DIAGNOSIS — Z72.0 TOBACCO USE: ICD-10-CM

## 2020-05-26 RX ORDER — POLYMYXIN B SULFATE AND TRIMETHOPRIM 1; 10000 MG/ML; [USP'U]/ML
1 SOLUTION OPHTHALMIC EVERY 6 HOURS
Qty: 1 BOTTLE | Refills: 0 | Status: SHIPPED | OUTPATIENT
Start: 2020-05-26 | End: 2020-06-05

## 2020-05-26 NOTE — PROGRESS NOTES
Alex Bonner is a 56 y.o. male evaluated via telephone on 20. Patient Identity confirmed by . Telephone encounter done in lieu of office visit due to extraordinary circumstances. A state of national and state emergency has been declared by the President and the [place] due to the [employer] pandemic. Pursuant to the emergency declaration under the [facility] [place] [address] [facility] authority and the [employer] and [employer] Act, this Virtual  Visit was conducted, with patient's consent, to reduce the patient's risk of exposure to COVID-19 and provide continuity of care for an established patient. Darshan Buchanan LPN coordinated virtual visit    Consent:  Patient and/or health care decision maker is aware that he may receive a bill for this telephone encounter, depending on his insurance coverage, and has provided verbal consent to proceed: Yes    Physician Location: Office  Patient Location: Home    CC: follow up  Information gathered from patient and/or health care decision maker. HPI: León Leary Sr. is a 56 y.o. male who was evaluated by synchronous (real-time) audio technology from her home. Patient with hx of gastric cancer, HTN, right hemiparesis and tobacco abuse. He complains of swelling on right upper eyelid. Denies scleral redness or vision changes. Patient denies HA, dizziness, SOB, CP, dysuria, acute myalgias or arthralgias. He continues to smoke. Current Outpatient Medications:     trimethoprim-polymyxin b (POLYTRIM) ophthalmic solution, Administer 1 Drop to both eyes every six (6) hours for 10 days. , Disp: 1 Bottle, Rfl: 0    prochlorperazine (COMPAZINE) 10 mg tablet, Take 1 Tab by mouth every eight (8) hours as needed for Nausea or Vomiting., Disp: 90 Tab, Rfl: 0    docusate sodium (COLACE) 100 mg capsule, Take 1 Cap by mouth two (2) times a day., Disp: 60 Cap, Rfl: 0    oxyCODONE-acetaminophen (PERCOCET) 5-325 mg per tablet, , Disp: , Rfl:     pantoprazole (PROTONIX) 40 mg tablet, , Disp: , Rfl:     ferrous sulfate 325 mg (65 mg iron) tablet, Take 1 Tab by mouth Daily (before breakfast). , Disp: 30 Tab, Rfl: 5     No Known Allergies     Patient Active Problem List    Diagnosis Date Noted    Renal cyst 03/30/2017    Staghorn kidney stones 03/30/2017    Cervical osteoarthritis 08/27/2015    Acute right hemiparesis ([facility] 08/27/2015    Arthritis of hand 06/22/2015    HTN (hypertension) 04/26/2012    Lumbar back pain     Neck pain     Chronic pain         Review of Systems:  Constitutional: Negative for fatigue, malaise  Resp: Negative for cough, wheezing or SOB  CV: Negative for chest pain, dizziness or palpitations  GI:  See HPI, Negative for nausea or abdominal pain  MS: Negative for acute myalgias or arthralgias   Neuro: Negative for HA, weakness or paresthesia  Psych: Negative for depression or anxiety       Assessment/Plan:  Details of this discussion including any medical advice provided: Patient advised as a precaution to self isolate at home, practice regular hand washing with soap and warm water and to wear a mask and utilize social distancing when necessary to be out in public places. ICD-10-CM ICD-9-CM    1. Hordeolum externum of left upper eyelid H00.014 373.11 trimethoprim-polymyxin b (POLYTRIM) ophthalmic solution   2. Malignant neoplasm of stomach, unspecified location ([facility] C16.9 151.9    3. Essential hypertension I10 401.9    4. Tobacco use Z72.0 305.1        Symptomatic therapy suggested: apply warm compresses QID, call prn if symptoms persist or worsen. Total Time: minutes: 5-10 minutes was spent on phone discussing above problems and plan. Patient medical history, prior OV notes, vitals flow sheet, lab results, medications, and allergies were reviewed during this encounter.     For phone encounters:  I affirm this is a patient initiated episode with an established Patient who has not had a related appointment within my department in the past 7 days or scheduled within the next 24 hours.     Note: not billable if this call serves to triage the patient into an appointment for the relevant concern    MD DIANE Gonzales & DAVIN MATHEWS [facility]  05/27/20

## 2020-07-20 ENCOUNTER — VIRTUAL VISIT (OUTPATIENT)
Dept: FAMILY MEDICINE CLINIC | Age: 60
End: 2020-07-20

## 2020-07-20 DIAGNOSIS — C16.9 MALIGNANT NEOPLASM OF STOMACH, UNSPECIFIED LOCATION (HCC): ICD-10-CM

## 2020-07-20 DIAGNOSIS — Z72.0 TOBACCO USE: ICD-10-CM

## 2020-07-20 DIAGNOSIS — I10 ESSENTIAL HYPERTENSION: Primary | ICD-10-CM

## 2020-07-20 NOTE — PROGRESS NOTES
1. Have you been to the ER, urgent care clinic since your last visit? Hospitalized since your last visit? NO    2. Have you seen or consulted any other health care providers outside of the [facility] since your last visit? Include any pap smears or colon screening.  No  Reviewed record in preparation for visit and have necessary documentation  opportunity was given for questions  Goals that were addressed and/or need to be completed during or after this appointment include    Health Maintenance Due   Topic Date Due    DTaP/Tdap/Td series (1 - Tdap) 04/29/1979    Shingrix Vaccine Age 50> (1 of 2) 04/29/2008    Colonoscopy  03/15/2017

## 2020-07-20 NOTE — PROGRESS NOTES
Pam Pedro is a 56 y.o. male evaluated via telephone on 20. Patient Identity confirmed by . Telephone encounter done in lieu of office visit due to extraordinary circumstances. A state of national and state emergency has been declared by the President and the [place] due to the [employer] pandemic. Pursuant to the emergency declaration under the [address] [place] [address] [facility] authority and the [employer] and [employer] Act, this Virtual  Visit was conducted, with patient's consent, to reduce the patient's risk of exposure to COVID-19 and provide continuity of care for an established patient. Emili Avalos LPN coordinated virtual visit    Consent:  Patient and/or health care decision maker is aware that he/she may receive a bill for this telephone encounter, depending on his insurance coverage, and has provided verbal consent to proceed: Yes    Physician Location: Office  Patient Location: Home    CC: follow up  Information gathered from patient and/or health care decision maker. HPI: Deborah Finney Sr. is a 56 y.o. male who was evaluated by synchronous (real-time) audio technology from his/her home. Patient with hx of gastric adenocarcinoma which has been managed at [facility]. He appears to be cancer free at this time. Patient with hx of HTN, right hemiparesis and tobacco abuse. Patient denies HA, dizziness, SOB, CP, abdominal pain, dysuria, acute myalgias or arthralgias.        Current Outpatient Medications:     prochlorperazine (COMPAZINE) 10 mg tablet, Take 1 Tab by mouth every eight (8) hours as needed for Nausea or Vomiting., Disp: 90 Tab, Rfl: 0    docusate sodium (COLACE) 100 mg capsule, Take 1 Cap by mouth two (2) times a day., Disp: 60 Cap, Rfl: 0    oxyCODONE-acetaminophen (PERCOCET) 5-325 mg per tablet, , Disp: , Rfl:     pantoprazole (PROTONIX) 40 mg tablet, , Disp: , Rfl:     ferrous sulfate 325 mg (65 mg iron) tablet, Take 1 Tab by mouth Daily (before breakfast). , Disp: 30 Tab, Rfl: 5     No Known Allergies     Patient Active Problem List    Diagnosis Date Noted    Renal cyst 03/30/2017    Staghorn kidney stones 03/30/2017    Cervical osteoarthritis 08/27/2015    Acute right hemiparesis ([facility] 08/27/2015    Arthritis of hand 06/22/2015    HTN (hypertension) 04/26/2012    Lumbar back pain     Neck pain     Chronic pain         Review of Systems:  Constitutional: Negative for fatigue, malaise  Resp: Negative for cough, wheezing or SOB  CV: Negative for chest pain, dizziness or palpitations  GI: Negative for nausea or abdominal pain  MS: Negative for acute myalgias or arthralgias   Neuro: Negative for HA, weakness or paresthesia  Psych: Negative for depression or anxiety       Assessment/Plan:  Details of this discussion including any medical advice provided: Patient advised as a precaution to self isolate at home, practice regular hand washing with soap and warm water and to wear a mask and utilize social distancing when necessary to be out in public places. ICD-10-CM ICD-9-CM    1. Essential hypertension  I10 401.9    2. Malignant neoplasm of stomach, unspecified location ([facility]  C16.9 151.9    3. Tobacco use  Z72.0 305.1        Total Time: minutes: 11-20 minutes was spent on phone discussing above problems and plan. Patient medical history, prior OV notes, vitals flow sheet, lab results, medications, and allergies were reviewed during this encounter. For phone encounters:  I affirm this is a patient initiated episode with an established Patient who has not had a related appointment within my department in the past 7 days or scheduled within the next 24 hours.     Note: not billable if this call serves to triage the patient into an appointment for the relevant concern    MD DIANE Garnett & DAVIN MATHEWS [facility]  07/20/20

## 2022-01-01 ENCOUNTER — OFFICE VISIT (OUTPATIENT)
Dept: FAMILY MEDICINE CLINIC | Age: 62
End: 2022-01-01
Payer: MEDICAID

## 2022-01-01 ENCOUNTER — TELEPHONE (OUTPATIENT)
Dept: FAMILY MEDICINE CLINIC | Age: 62
End: 2022-01-01

## 2022-01-01 VITALS
SYSTOLIC BLOOD PRESSURE: 113 MMHG | HEART RATE: 68 BPM | HEIGHT: 73 IN | DIASTOLIC BLOOD PRESSURE: 84 MMHG | BODY MASS INDEX: 14.66 KG/M2 | OXYGEN SATURATION: 99 % | RESPIRATION RATE: 16 BRPM | TEMPERATURE: 97.7 F | WEIGHT: 110.6 LBS

## 2022-01-01 DIAGNOSIS — R10.9 ABDOMINAL PAIN, UNSPECIFIED ABDOMINAL LOCATION: ICD-10-CM

## 2022-01-01 DIAGNOSIS — Z72.0 TOBACCO USE: ICD-10-CM

## 2022-01-01 DIAGNOSIS — Z09 HOSPITAL DISCHARGE FOLLOW-UP: ICD-10-CM

## 2022-01-01 DIAGNOSIS — C16.9 MALIGNANT NEOPLASM OF STOMACH, UNSPECIFIED LOCATION (HCC): ICD-10-CM

## 2022-01-01 DIAGNOSIS — I81 PORTAL VEIN THROMBOSIS: Primary | ICD-10-CM

## 2022-01-01 PROCEDURE — 99214 OFFICE O/P EST MOD 30 MIN: CPT | Performed by: FAMILY MEDICINE

## 2022-01-01 PROCEDURE — 1111F DSCHRG MED/CURRENT MED MERGE: CPT | Performed by: FAMILY MEDICINE

## 2022-01-01 RX ORDER — ERGOCALCIFEROL 1.25 MG/1
1.25 CAPSULE ORAL
COMMUNITY
Start: 2021-01-01 | End: 2022-12-23

## 2022-01-01 RX ORDER — APIXABAN 5 MG/1
5 TABLET, FILM COATED ORAL 2 TIMES DAILY
Qty: 60 TABLET | Refills: 2 | Status: SHIPPED | OUTPATIENT
Start: 2022-01-01

## 2022-01-01 RX ORDER — APIXABAN 5 MG/1
TABLET, FILM COATED ORAL
COMMUNITY
Start: 2021-01-01 | End: 2022-01-01 | Stop reason: SDUPTHER

## 2022-01-01 RX ORDER — ALBUTEROL SULFATE 0.83 MG/ML
2.5 SOLUTION RESPIRATORY (INHALATION)
COMMUNITY
Start: 2021-01-01 | End: 2022-12-19

## 2022-01-01 RX ORDER — MULTIVITAMIN
1 CAPSULE ORAL DAILY
COMMUNITY

## 2022-01-01 RX ORDER — LANOLIN ALCOHOL/MO/W.PET/CERES
1000 CREAM (GRAM) TOPICAL DAILY
COMMUNITY
Start: 2021-01-01

## 2022-01-01 RX ORDER — METOCLOPRAMIDE 5 MG/1
5 TABLET ORAL
COMMUNITY

## 2022-01-01 RX ORDER — ACETAMINOPHEN 325 MG/1
TABLET ORAL
COMMUNITY
Start: 2021-01-01

## 2022-01-01 RX ORDER — OXYCODONE HYDROCHLORIDE 5 MG/1
5 TABLET ORAL
Qty: 21 TABLET | Refills: 0 | Status: SHIPPED | OUTPATIENT
Start: 2022-01-01 | End: 2022-01-01

## 2022-01-01 RX ORDER — ONDANSETRON 4 MG/1
TABLET, FILM COATED ORAL
COMMUNITY
Start: 2021-01-01

## 2022-01-01 RX ORDER — FLUCONAZOLE 100 MG/1
TABLET ORAL
COMMUNITY
Start: 2021-01-01

## 2022-01-01 RX ORDER — FOLIC ACID 1 MG/1
TABLET ORAL
COMMUNITY
Start: 2021-01-01

## 2022-01-01 RX ORDER — ALBUTEROL SULFATE 0.83 MG/ML
SOLUTION RESPIRATORY (INHALATION)
COMMUNITY
Start: 2021-01-01

## 2022-01-01 RX ORDER — LANOLIN ALCOHOL/MO/W.PET/CERES
100 CREAM (GRAM) TOPICAL DAILY
COMMUNITY
Start: 2021-01-01 | End: 2022-12-19

## 2022-01-06 NOTE — PATIENT INSTRUCTIONS
Breakfast Essentials (supplement)     High-Calorie and High-Protein Diet: Care Instructions  Overview     A high-calorie, high-protein diet gives you more energy and extra nutrition to help your body heal. Your doctor and dietitian can help you design a diet based on your health and what you prefer to eat. Always talk with your doctor or dietitian before you make changes in your diet. Follow-up care is a key part of your treatment and safety. Be sure to make and go to all appointments, and call your doctor if you are having problems. It's also a good idea to know your test results and keep a list of the medicines you take. How can you care for yourself at home? · Eat three meals a day, plus snacks in between and at bedtime. · Include favorite foods in your meals. This will help make meals more pleasant. · Drink your beverage at the end of the meal, because drinking before or during the meal can fill you up. · Eat high-protein foods, such as:  ? Meat, fish, and poultry. ? Milk and milk products. Add powdered milk to other foods (such as pudding or soups) to boost the protein. ? Eggs. ? Cooked dried beans and peas. ? Peanut butter, nuts, and seeds. ? Tofu.  ? Cheeses. ? Protein bars. · Eat high-calorie foods, such as:  ? Butter, honey, and brown sugar, added to foods to make them taste better. ? Oils, sauces, and gravies. ? Peanut butter. ? Whole milk, yogurt, mayonnaise, and sour cream.  ? Granola cereal with fruit and granola bars. ? Muffins, pancakes, waffles, and other breads. ? Milkshakes, puddings, and custard. · Try a liquid meal replacement, such as Ensure, Boost, or instant breakfast drinks, if you have trouble eating solid foods. They will give you both calories and protein. Soups and smoothies also are good sources of nutrition. · Keep snacks around that are easy to eat, such as pudding, energy bars, ice cream, and flavored ice pops. Where can you learn more?   Go to http://www.gray.com/  Enter [id number] in the search box to learn more about \"High-Calorie and High-Protein Diet: Care Instructions. \"  Current as of: December 17, 2020               Content Version: 13.0  © 3232-8366 [employer], [employer]. Care instructions adapted under license by [employer] (which disclaims liability or warranty for this information). If you have questions about a medical condition or this instruction, always ask your healthcare professional. [address] any warranty or liability for your use of this information.

## 2022-01-06 NOTE — PROGRESS NOTES
1. Have you been to the ER, urgent care clinic since your last visit? Hospitalized since your last visit? Yes When: [address] transfered to [facility]     2. Have you seen or consulted any other health care providers outside of the [address] [place] [address] since your last visit? Include any pap smears or colon screening. No    Reviewed record in preparation for visit and have necessary documentation  Pt did not bring medication to office visit for review  Patient is accompanied by self I have received verbal consent from [address] [place] [address] Drive. to discuss any/all medical information while they are present in the room.     Goals that were addressed and/or need to be completed during or after this appointment include     Health Maintenance Due   Topic Date Due    COVID-19 Vaccine (1) Never done    DTaP/Tdap/Td series (1 - Tdap) Never done    Shingrix Vaccine Age 50> (1 of 2) Never done    Colorectal Cancer Screening Combo  03/15/2017

## 2022-01-09 PROBLEM — Z72.0 TOBACCO USE: Status: ACTIVE | Noted: 2022-01-01

## 2022-01-09 PROBLEM — C16.9 MALIGNANT NEOPLASM OF STOMACH (HCC): Status: ACTIVE | Noted: 2022-01-01

## 2022-01-10 NOTE — PROGRESS NOTES
Chief Complaint   Patient presents with   [facility] Follow Up     blood clot, feet swelling     Nausea     he is a 59y.o. year old male who presents for transition of care. Patient with hospitalization at [facility] for abdominal pain and dx of portal thrombosis. He is followed by heme-oncology for neoplasm of stomach. Patient with hx of HTN, right hemiparesis and tobacco abuse. He complains of continued abdominal pain and asks for pain medication. Available hospital notes and  reviewed. He has a hx of OA of neck and lower back. Patient is under weight. He continues to smoke. BP Readings from Last 3 Encounters:   01/06/22 113/84   06/18/19 115/76   03/01/19 (!) 125/91     Wt Readings from Last 3 Encounters:   01/06/22 110 lb 9.6 oz (50.2 kg)   06/18/19 124 lb 6.4 oz (56.4 kg)   03/01/19 128 lb (58.1 kg)     Body mass index is 14.59 kg/m². Medications:     Current Outpatient Medications   Medication Sig    fluconazole (DIFLUCAN) 100 mg tablet     metoclopramide HCl (Reglan) 5 mg tablet Take 5 mg by mouth Before breakfast, lunch, and dinner.  multivitamin capsule Take 1 Capsule by mouth daily.  thiamine HCL (B-1) 100 mg tablet Take 100 mg by mouth daily.  ondansetron hcl (ZOFRAN) 4 mg tablet     folic acid (FOLVITE) 1 mg tablet     ergocalciferol (ERGOCALCIFEROL) 1,250 mcg (50,000 unit) capsule Take 1.25 mg by mouth.  acetaminophen (TYLENOL) 325 mg tablet     cyanocobalamin 1,000 mcg tablet Take 1,000 mcg by mouth daily.  albuterol (PROVENTIL VENTOLIN) 2.5 mg /3 mL (0.083 %) nebu     albuterol (PROVENTIL VENTOLIN) 2.5 mg /3 mL (0.083 %) nebu 2.5 mg.    Eliquis 5 mg tablet Take 1 Tablet by mouth two (2) times a day.  oxyCODONE IR (ROXICODONE) 5 mg immediate release tablet Take 1 Tablet by mouth every eight (8) hours as needed for Pain for up to 7 days. Max Daily Amount: 15 mg.    docusate sodium (COLACE) 100 mg capsule Take 1 Cap by mouth two (2) times a day.     pantoprazole (PROTONIX) 40 mg tablet     ferrous sulfate 325 mg (65 mg iron) tablet Take 1 Tab by mouth Daily (before breakfast). No current facility-administered medications for this visit. Problem List:     Patient Active Problem List    Diagnosis Date Noted    Malignant neoplasm of stomach ([facility] 01/09/2022    Tobacco use 01/09/2022    Renal cyst 03/30/2017    Staghorn kidney stones 03/30/2017    Cervical osteoarthritis 08/27/2015    Acute right hemiparesis ([address] 08/27/2015    Arthritis of hand 06/22/2015    HTN (hypertension) 04/26/2012    Lumbar back pain     Neck pain     Chronic pain        Medical History:     Past Medical History:   Diagnosis Date    Cervical osteoarthritis 8/27/2015    Chronic pain     neck and back    Hypertension     Lumbar back pain     Malignant neoplasm of stomach ([facility] 1/9/2022    Neck pain     Staghorn kidney stones 3/30/2017    Staghorn kidney stones 3/30/2017    A large staghorn calculus in superior pole of left kidney 2 cm x 1.6 cm       Allergies: Allergies   Allergen Reactions    Fentanyl Itching     Reaction Type: Allergy       Surgical History:     Past Surgical History:   Procedure Laterality Date    HX ORTHOPAEDIC      left knee       Social History:     Social History     Socioeconomic History    Marital status:    Tobacco Use    Smoking status: Current Some Day Smoker     Packs/day: 1.00    Smokeless tobacco: Never Used   Substance and Sexual Activity    Alcohol use:  Yes     Alcohol/week: 0.0 standard drinks     Comment: occassioally    Drug use: No    Sexual activity: Yes     Partners: Female          Patient Active Problem List   Diagnosis Code    Lumbar back pain M54.50    Neck pain M54.2    Chronic pain G89.29    HTN (hypertension) I10    Arthritis of hand M19.049    Cervical osteoarthritis M47.812    Acute right hemiparesis (HCC) G81.91    Renal cyst N28.1    Staghorn kidney stones N20.0    Malignant neoplasm of stomach ([facility] C16.9    Tobacco use Z72.0     Past Surgical History:   Procedure Laterality Date    HX ORTHOPAEDIC      left knee     Social History     Socioeconomic History    Marital status:      Spouse name: Not on file    Number of children: Not on file    Years of education: Not on file    Highest education level: Not on file   Occupational History    Not on file   Tobacco Use    Smoking status: Current Some Day Smoker     Packs/day: 1.00    Smokeless tobacco: Never Used   Substance and Sexual Activity    Alcohol use: Yes     Alcohol/week: 0.0 standard drinks     Comment: occassioally    Drug use: No    Sexual activity: Yes     Partners: Female   Other Topics Concern    Not on file   Social History Narrative    Not on file     Social Determinants of Health     Financial Resource Strain:     Difficulty of Paying Living Expenses: Not on file   Food Insecurity:     Worried About Running Out of Food in the Last Year: Not on file    Eduardo of Food in the Last Year: Not on file   Transportation Needs:     Lack of Transportation (Medical): Not on file    Lack of Transportation (Non-Medical):  Not on file   Physical Activity:     Days of Exercise per Week: Not on file    Minutes of Exercise per Session: Not on file   Stress:     Feeling of Stress : Not on file   Social Connections:     Frequency of Communication with Friends and Family: Not on file    Frequency of Social Gatherings with Friends and Family: Not on file    Attends Restorationist Services: Not on file    Active Member of Clubs or Organizations: Not on file    Attends Club or Organization Meetings: Not on file    Marital Status: Not on file   Intimate Partner Violence:     Fear of Current or Ex-Partner: Not on file    Emotionally Abused: Not on file    Physically Abused: Not on file    Sexually Abused: Not on file   Housing Stability:     Unable to Pay for Housing in the Last Year: Not on file    Number of [place] in the Last Year: Not on file    Unstable Housing in the Last Year: Not on file     Family History   Problem Relation Age of Onset    Cancer Mother     Cancer Father     Stroke Father      Current Outpatient Medications   Medication Sig    fluconazole (DIFLUCAN) 100 mg tablet     metoclopramide HCl (Reglan) 5 mg tablet Take 5 mg by mouth Before breakfast, lunch, and dinner.  multivitamin capsule Take 1 Capsule by mouth daily.  thiamine HCL (B-1) 100 mg tablet Take 100 mg by mouth daily.  ondansetron hcl (ZOFRAN) 4 mg tablet     folic acid (FOLVITE) 1 mg tablet     ergocalciferol (ERGOCALCIFEROL) 1,250 mcg (50,000 unit) capsule Take 1.25 mg by mouth.  acetaminophen (TYLENOL) 325 mg tablet     cyanocobalamin 1,000 mcg tablet Take 1,000 mcg by mouth daily.  albuterol (PROVENTIL VENTOLIN) 2.5 mg /3 mL (0.083 %) nebu     albuterol (PROVENTIL VENTOLIN) 2.5 mg /3 mL (0.083 %) nebu 2.5 mg.    Eliquis 5 mg tablet Take 1 Tablet by mouth two (2) times a day.  oxyCODONE IR (ROXICODONE) 5 mg immediate release tablet Take 1 Tablet by mouth every eight (8) hours as needed for Pain for up to 7 days. Max Daily Amount: 15 mg.    docusate sodium (COLACE) 100 mg capsule Take 1 Cap by mouth two (2) times a day.  pantoprazole (PROTONIX) 40 mg tablet     ferrous sulfate 325 mg (65 mg iron) tablet Take 1 Tab by mouth Daily (before breakfast). No current facility-administered medications for this visit. Allergies   Allergen Reactions    Fentanyl Itching     Reaction Type:  Allergy       Review of Systems:  Constitutional: feeling well, denies fatigue, malaise  Skin: Negative for rash or lesion  HEENT: Negative for acute hearing or vision changes  Respiratory: Negative for cough, wheezing or SOB  Cardiovascular: Negative for chest pain, dizziness or palpitations  Gastrointestinal: see HPI  Genital/urinary: Negative for dysuria or voiding dysfunction  Musculoskeletal: Negative for acute myalgia or arthralgia Neurological: see HPI, Negative for HA, acute weakness or paresthesia  Psychlogical: Negative for depression or anxiety     Vitals:    01/06/22 1057   BP: 113/84   Pulse: 68   Resp: 16   Temp: 97.7 °F (36.5 °C)   TempSrc: Oral   SpO2: 99%   Weight: 110 lb 9.6 oz (50.2 kg)   Height: 6' 1\" (1.854 m)       Physical Examination:  General: Thin, in no acute distress  Skin: Warm and dry, normal tone and turgor  Head: Normocephalic, atraumatic  Eyes: Sclera clear, EOMI  Neck: Normal range of motion  Respiratory: Clear to auscultation bilaterally with symmetrical effort  Cardiovascular: Normal S1, S2, Regular rate and rhythm  Extremities: Full range of motion, antalgic gait  Neurological: Right hemiparesis  Psychological: Active, alert and oriented. Affect appropriate     Diagnoses and all orders for this visit:    1. Portal vein thrombosis  -     Eliquis 5 mg tablet; Take 1 Tablet by mouth two (2) times a day. 2. Malignant neoplasm of stomach, unspecified location ([address]    3. Abdominal pain, unspecified abdominal location  -     oxyCODONE IR (ROXICODONE) 5 mg immediate release tablet; Take 1 Tablet by mouth every eight (8) hours as needed for Pain for up to 7 days. Max Daily Amount: 15 mg.    4. Tobacco use    5. Hospital discharge follow-up  -     PA DISCHARGE MEDS RECONCILED W/ CURRENT OUTPATIENT MED LIST      Plan of care:  Diagnoses were discussed in detail with patient. Available hospital records reviewed. Medication risks/benefits/side effects discussed with patient. All of the patient's questions were addressed and answered to apparent satisfaction. The patient understands and agrees with our plan of care. The patient knows to call back if they have questions about the plan of care or if symptoms change. The patient received an After-Visit Summary which contains VS, diagnoses, orders, allergy and medication lists.       Future Appointments   Date Time Provider Sachi Muñoz   7/6/2022  8:20 AM Lulu Covarrubias MD [id number]

## 2022-01-10 NOTE — TELEPHONE ENCOUNTER
Pt has an appt in July with Dr. Katy Tamayo. It needs to be rescheduled bc Dr. Katy Tamayo will be out of the office.  Please reschedule appt